# Patient Record
Sex: MALE | Race: WHITE | URBAN - METROPOLITAN AREA
[De-identification: names, ages, dates, MRNs, and addresses within clinical notes are randomized per-mention and may not be internally consistent; named-entity substitution may affect disease eponyms.]

---

## 2022-07-18 ENCOUNTER — OFFICE VISIT (OUTPATIENT)
Dept: ORTHOPEDIC SURGERY | Age: 62
End: 2022-07-18
Payer: COMMERCIAL

## 2022-07-18 VITALS — WEIGHT: 240 LBS | RESPIRATION RATE: 15 BRPM | HEIGHT: 68 IN | BODY MASS INDEX: 36.37 KG/M2

## 2022-07-18 DIAGNOSIS — M25.561 RIGHT KNEE PAIN, UNSPECIFIED CHRONICITY: Primary | ICD-10-CM

## 2022-07-18 PROCEDURE — L1812 KO ELASTIC W/JOINTS PRE OTS: HCPCS | Performed by: PHYSICIAN ASSISTANT

## 2022-07-18 PROCEDURE — 99203 OFFICE O/P NEW LOW 30 MIN: CPT | Performed by: PHYSICIAN ASSISTANT

## 2022-07-18 RX ORDER — ATORVASTATIN CALCIUM 20 MG/1
20 TABLET, FILM COATED ORAL DAILY
COMMUNITY

## 2022-07-18 RX ORDER — HYDROCORTISONE ACETATE 0.5 %
CREAM (GRAM) TOPICAL
COMMUNITY

## 2022-07-18 RX ORDER — METHYLPREDNISOLONE 4 MG/1
4 TABLET ORAL SEE ADMIN INSTRUCTIONS
Qty: 1 KIT | Refills: 0 | Status: SHIPPED | OUTPATIENT
Start: 2022-07-18 | End: 2022-07-24

## 2022-07-18 RX ORDER — VALSARTAN 160 MG/1
160 TABLET ORAL DAILY
COMMUNITY

## 2022-07-18 RX ORDER — AMLODIPINE BESYLATE 10 MG/1
10 TABLET ORAL DAILY
COMMUNITY

## 2022-07-18 NOTE — PROGRESS NOTES
This dictation was done with [employer] dictation and may contain mechanical errors related to translation. I have today reviewed with Tahir Espinoza the clinically relevant, past medical history, medications, allergies, family history, social history, and Review Of Systems form the patients most recent history form & I have documented any details relevant to today's presenting complaints in my history below. Mr. Dash Murillo's self-reported past medical history, medications, allergies, family history, social history, and Review Of Systems form has been scanned into the chart under the \"Media\" tab. Subjective:  Tahir Espinoza is a 58 y.o. who is here complaining of pain in his right knee that has increased recently. He does not remember 1 specific activity but now has pain with walking up and down steps prolonged sitting infectious sleep at night. The pains in the meet the pain is in the medial aspect of his right knee. He was sent for x-rays including standing AP lateral sunrise view and a tunnel view of his right knee      Patient Active Problem List   Diagnosis    Primary osteoarthritis of right knee           Current Outpatient Medications on File Prior to Visit   Medication Sig Dispense Refill    atorvastatin (LIPITOR) 20 MG tablet Take 20 mg by mouth in the morning. valsartan (DIOVAN) 160 MG tablet Take 160 mg by mouth in the morning. amLODIPine (NORVASC) 10 MG tablet Take 10 mg by mouth in the morning. metFORMIN (GLUCOPHAGE) 500 MG tablet Take 500 mg by mouth in the morning and 500 mg in the evening. Take with meals.       Glucosamine-Chondroit-Vit C-Mn (GLUCOSAMINE 1500 COMPLEX) CAPS Take by mouth      loratadine (CLARITIN) 10 MG capsule Take by mouth      Multiple Vitamins-Minerals (THERAPEUTIC MULTIVITAMIN-MINERALS) tablet Take 1 tablet by mouth daily      Fexofenadine HCl (MUCINEX ALLERGY PO) Take by mouth (Patient not taking: Reported on 7/18/2022)       No current facility-administered medications on file prior to visit. Objective:   Resp. rate 15, height 5' 8\" (1.727 m), weight 240 lb (108.9 kg). On examination this pleasant 59-year-old gentleman who is alert and oriented x3 he has a 36 BMI but decent quad tone about 0 to calf on thigh range of motion no varus or valgus laxity but overall he has a varus alignment and medial joint line tenderness along with swelling and soreness. There is crepitus during flexion extension through the patellofemoral joint. Negative for Lachman negative for a pivot shift. Neuro exam grossly intact both lower extremities. Intact sensation to light touch. Motor exam 4+ to 5/5 in all major motor groups. Negative Overton's sign. Skin is warm, dry and intact with out erythema or significant increased temperature around the knee joint(s). There are no cutaneous lesions or lymphadenopathy present. X-RAYS:  X-rays taken the office today of the knee showed no obvious fractures there is almost complete bone-on-bone osteoarthritis in the medial compartment with joint space loss subchondral sclerosis and some osteophyte formation. Patellofemoral joint pretty well-preserved there is no obvious fracture seen elsewhere and this was shown to the patient      Assessment:  Medial joint osteoarthritis possible medial meniscus injury    Plan:  During today's visit, there was approximately 35 minutes of face-to-face discussion in regards to the patient's current condition and treatment options. More than 50 % of the time was counseling and coordination of care as indicated above. At this point we will place him on a strong anti-inflammatory we will fit him with an economy hinged brace and have him do some quad exercises. Follow-up with us in 2 to 3 weeks for repeat examination      PROCEDURE NOTE:        Procedures    Breg Economy Hinged Knee WrapAround Brace     Patient was prescribed a Breg Economy Hinged Wrap Around Knee Brace.   The right knee will require stabilization / immobilization from this semi-rigid / rigid orthosis to improve their function. The orthosis will assist in protecting the affected area, provide functional support and facilitate healing. The patient was educated and fit by a healthcare professional with expert knowledge and specialization in brace application while under the direct supervision of the treating physician. Verbal and written instructions for the use of and application of this item were provided. They were instructed to contact the office immediately should the brace result in increased pain, decreased sensation, increased swelling or worsening of the condition.            They will schedule a follow up in 2 to 3 weeks

## 2022-07-25 ENCOUNTER — OFFICE VISIT (OUTPATIENT)
Dept: ORTHOPEDIC SURGERY | Age: 62
End: 2022-07-25
Payer: COMMERCIAL

## 2022-07-25 VITALS — BODY MASS INDEX: 36.37 KG/M2 | HEIGHT: 68 IN | WEIGHT: 240 LBS

## 2022-07-25 DIAGNOSIS — M65.9 SYNOVITIS OF RIGHT KNEE: ICD-10-CM

## 2022-07-25 DIAGNOSIS — M17.11 PRIMARY OSTEOARTHRITIS OF RIGHT KNEE: Primary | ICD-10-CM

## 2022-07-25 DIAGNOSIS — M22.41 CHONDROMALACIA PATELLAE OF RIGHT KNEE: ICD-10-CM

## 2022-07-25 PROCEDURE — 20610 DRAIN/INJ JOINT/BURSA W/O US: CPT | Performed by: FAMILY MEDICINE

## 2022-07-25 PROCEDURE — 99203 OFFICE O/P NEW LOW 30 MIN: CPT | Performed by: FAMILY MEDICINE

## 2022-07-25 RX ORDER — LIDOCAINE HYDROCHLORIDE 10 MG/ML
1 INJECTION, SOLUTION INFILTRATION; PERINEURAL ONCE
Status: COMPLETED | OUTPATIENT
Start: 2022-07-25 | End: 2022-07-25

## 2022-07-25 RX ORDER — BETAMETHASONE SODIUM PHOSPHATE AND BETAMETHASONE ACETATE 3; 3 MG/ML; MG/ML
12 INJECTION, SUSPENSION INTRA-ARTICULAR; INTRALESIONAL; INTRAMUSCULAR; SOFT TISSUE ONCE
Status: COMPLETED | OUTPATIENT
Start: 2022-07-25 | End: 2022-07-25

## 2022-07-25 RX ORDER — BUPIVACAINE HYDROCHLORIDE 2.5 MG/ML
2 INJECTION, SOLUTION INFILTRATION; PERINEURAL ONCE
Status: COMPLETED | OUTPATIENT
Start: 2022-07-25 | End: 2022-07-25

## 2022-07-25 RX ORDER — MELOXICAM 15 MG/1
15 TABLET ORAL DAILY
Qty: 30 TABLET | Refills: 3 | Status: SHIPPED | OUTPATIENT
Start: 2022-07-25

## 2022-07-25 RX ADMIN — BUPIVACAINE HYDROCHLORIDE 5 MG: 2.5 INJECTION, SOLUTION INFILTRATION; PERINEURAL at 08:01

## 2022-07-25 RX ADMIN — LIDOCAINE HYDROCHLORIDE 1 ML: 10 INJECTION, SOLUTION INFILTRATION; PERINEURAL at 08:02

## 2022-07-25 RX ADMIN — BETAMETHASONE SODIUM PHOSPHATE AND BETAMETHASONE ACETATE 12 MG: 3; 3 INJECTION, SUSPENSION INTRA-ARTICULAR; INTRALESIONAL; INTRAMUSCULAR; SOFT TISSUE at 08:00

## 2022-07-25 NOTE — PROGRESS NOTES
Chief Complaint  Knee Pain ([facility])      Initial consultation progressively worsening right anterior medial knee pain    History of Present Illness:  Aleena Mendes is a 58 y.o. male who is a very pleasant weight male who is a  for [employer] and does play recreational [employer] ball quite frequently and walk and per the patient is a prediabetic on metformin and is hoping to lose weight and states that he has had episodic pain to his right knee for the past several years but more of a dull and achy but states that in early July 2022 became much more problematic with increased pain and swelling and actively limping. He initially treated himself with some ice and Tylenol but was eventually evaluated in after-hours on 7/18/2020 was placed on Medrol pack which is helped 60 to 70%. He was given a wraparound brace which she was using incorrectly. It is more of a soreness and stiffness both anteriorly and medially and was most problematic with sitting for prolonged period getting up such as getting up from his desk and going up and down stairs and walking on uneven surfaces. He did have x-rays showing significant medial compartment arthropathy but has no previous history of surgery. His major goal is to continue to walk and exercise to help lose weight and control his sugars. He is being seen today for orthopedic and sports consultation with imaging. He has not been on anti-inflammatories      Pain Assessment  Location of Pain: Knee  Location Modifiers: Right  Severity of Pain: 2  Quality of Pain: Dull, Aching  Duration of Pain: Persistent  Frequency of Pain: Constant  Limiting Behavior: Yes  Relieving Factors: Rest  Result of Injury: No  Work-Related Injury: No  Are there other pain locations you wish to document?: No         Medical History     Patient's medications, allergies, past medical, surgical, social and family histories were reviewed and updated as appropriate.     Review of Systems  Pertinent items are noted in HPI  Review of systems reviewed from Patient History Form dated on 7/25/2022 and available in the patient's chart under the Media tab. Vital Signs  There were no vitals filed for this visit. General Exam:     Constitutional: Patient is adequately groomed with no evidence of malnutrition  DTRs: Deep tendon reflexes are intact  Mental Status: The patient is oriented to time, place and person. The patient's mood and affect are appropriate. Lymphatic: The lymphatic examination bilaterally reveals all areas to be without enlargement or induration. Vascular: Examination reveals no swelling or calf tenderness. Peripheral pulses are palpable and 2+. Neurological: The patient has good coordination. There is no weakness or sensory deficit. Knee Examination  Inspection: There is no evidence of high-grade deformities although he does have some patellofemoral crepitation    Palpation: Subclinical tenderness over the medial and lateral patellofemoral facet and some residual pain with patellar grind testing and some mild pain with crepitation with medial Byron's remarkable for crepitation. Negative lateral Byron's. Rang of Motion: Stiff in terminal 5 to 10 degrees of extension with flexion to about 115. Hamstrings are tight. Strength: 4 Plus out of 5 with knee flexion extension. Special Tests: He does have some pain residually with patellar grind testing and a Byron's with rotation. Not since high-grade instability. Skin: There are no rashes, ulcerations or lesions. Distal neurovascular exam is intact. Gait: Reasonable gait. Reflex symmetrically preserved    Additional Comments:     Additional Examinations:  Contralateral Exam: Examination of the left knee reveals intact skin. There is no focal tenderness. The patient demonstrates full painless range of motion with regards to flexion and extension. Strength is 5/5 thorough out all planes. Ligamentous stability is grossly intact. Right Lower Extremity: Examination of the right lower extremity does not show any tenderness, deformity or injury. Range of motion is unremarkable. There is no gross instability. There are no rashes, ulcerations or lesions. Strength and tone are normal.  Left Lower Extremity: Examination of the left lower extremity does not show any tenderness, deformity or injury. Range of motion is unremarkable. There is no gross instability. There are no rashes, ulcerations or lesions. Strength and tone are normal.      Diagnostic Test Findings: Right knee AP and PA weightbearing sunrise and lateral films were obtained 7/8/2022 and does show advanced affected bone-on-bone changes to the medial compartment with fairly prominent patellofemoral arthropathy with spurring      Assessment : #1. Chronic recently worsening right knee pain with advanced effective bone-on-bone changes medical [place] Even with patellofemoral neuropathy and synovitis    Impression:  Encounter Diagnoses   Name Primary? Primary osteoarthritis of right knee Yes    Chondromalacia patellae of right knee     Synovitis of right knee        Office Procedures:  Orders Placed This Encounter   Procedures    Ambulatory referral to Physical Therapy     Referral Priority:   Routine     Referral Type:   Eval and Treat     Referral Reason:   Specialty Services Required     Requested Specialty:   Physical Therapist     Number of Visits Requested:   1    [place] ARTHROCENTESIS ASPIR&/INJ MAJOR JT/BURSA W/O US       Treatment Plan:  Treatment options were discussed with Ashley Muhammad. We did review his plain films and exam findings. He does have quite prominent medial compartment osteoarthritis and significant patellofemoral arthropathy with spurring. He was quite symptomatic a few weeks ago but since finishing his Medrol pack is improved about 60 to 70%.   After discussing options, we did inject his right knee today using 2 cc of Celestone, 2 cc of Marcaine, 1 cc Xylocaine. We will start him in a short course of supervised physical therapy and will continue with his wraparound wrist with ambulatory activities. We also started him on meloxicam.  Potential for viscosupplementation was discussed. We will see him back in about 4 weeks for follow-up. He will contact us in the interim with questions or concerns. This dictation was performed with a verbal recognition program (DRAGON) and it was checked for errors. It is possible that there are still dictated errors within this office note. If so, please bring any errors to my attention for an addendum. All efforts were made to ensure that this office note is accurate.

## 2022-07-28 ENCOUNTER — HOSPITAL ENCOUNTER (OUTPATIENT)
Dept: PHYSICAL THERAPY | Age: 62
Setting detail: THERAPIES SERIES
Discharge: HOME | End: 2022-07-28
Payer: COMMERCIAL

## 2022-07-28 PROCEDURE — 97110 THERAPEUTIC EXERCISES: CPT | Performed by: PHYSICAL THERAPIST

## 2022-07-28 PROCEDURE — 97161 PT EVAL LOW COMPLEX 20 MIN: CPT | Performed by: PHYSICAL THERAPIST

## 2022-07-28 PROCEDURE — 97140 MANUAL THERAPY 1/> REGIONS: CPT | Performed by: PHYSICAL THERAPIST

## 2022-07-28 NOTE — FLOWSHEET NOTE
weekly - 3 sets - 10 reps  Small Range Straight Leg Raise - 2 x daily - 7 x weekly - 3 sets - 10 reps  Hooklying Clamshell with Resistance - 2 x daily - 7 x weekly - 3 sets - 10 reps  Seated Table Hamstring Stretch - 2 x daily - 7 x weekly - 1 sets - 3 reps - 30 hold  Long Sitting Calf Stretch with Strap - 2 x daily - 7 x weekly - 3 reps - 30 hold        Therapeutic Ex (99463) Sets/sec Reps Notes/CUES   Long sitting hamstring stretch :10 5    Long sitting gastroc stretch :30 3    SLR 1 10    QS with roll :10 10    SAQ with ball squeeze 1 10    HL clams 1 15 Blue TB                                       Manual Intervention (17088)      Patellar mobs 6'     ITB CFM distally 2'                             NMR re-education (93456)   CUES NEEDED   Pt. Ed:re findings, anatomy, progosis, HEP progression, PPP strategies 8'                                                     Therapeutic Activity (04186)                                                Therapeutic Exercise and NMR EXR  [x] (20290) Provided verbal/tactile cueing for activities related to strengthening, flexibility, endurance, ROM for improvements in LE, proximal hip, and core control with self care, mobility, lifting, ambulation.  [] (99152) Provided verbal/tactile cueing for activities related to improving balance, coordination, kinesthetic sense, posture, motor skill, proprioception  to assist with LE, proximal hip, and core control in self care, mobility, lifting, ambulation and eccentric single leg control.      NMR and Therapeutic Activities:    [] (82216 or 47003) Provided verbal/tactile cueing for activities related to improving balance, coordination, kinesthetic sense, posture, motor skill, proprioception and motor activation to allow for proper function of core, proximal hip and LE with self care and ADLs  [] (05846) Gait Re-education- Provided training and instruction to the patient for proper LE, core and proximal hip recruitment and positioning and eccentric body weight control with ambulation re-education including up and down stairs     Home Exercise Program:    [x] (89904) Reviewed/Progressed HEP activities related to strengthening, flexibility, endurance, ROM of core, proximal hip and LE for functional self-care, mobility, lifting and ambulation/stair navigation   [] (93472)Reviewed/Progressed HEP activities related to improving balance, coordination, kinesthetic sense, posture, motor skill, proprioception of core, proximal hip and LE for self care, mobility, lifting, and ambulation/stair navigation      Manual Treatments:  PROM / STM / Oscillations-Mobs:  G-I, II, III, IV (PA's, Inf., Post.)  [x] (12090) Provided manual therapy to mobilize LE, proximal hip and/or LS spine soft tissue/joints for the purpose of modulating pain, promoting relaxation,  increasing ROM, reducing/eliminating soft tissue swelling/inflammation/restriction, improving soft tissue extensibility and allowing for proper ROM for normal function with self care, mobility, lifting and ambulation. Modalities:     [] GAME READY (VASO)- for significant edema, swelling, pain control. Charges  Timed Code Treatment Minutes: 26   Total Treatment Minutes: 45     Medicare total (add KX at $2000)         BW time in/ time out:    TE time in /out    Manual time in/out    Neuro re ed time in/out    Untimed minutes        [x] EVAL (LOW) 19686   [] EVAL (MOD) 93054  [] EVAL (HIGH) 74423   [] RE-EVAL     [] HP(58708) x     [] IONTO  [] NMR (53431) x     [] VASO  [] Manual (96116) x      [] Other:  [] TA x      [] Mech Traction (86812)  [] ES(attended) (49929)      [] ES (un) (77364):       GOALS:   Patient stated goal: Managing knee problems  [] Progressing: [] Met: [] Not Met: [] Adjusted    Therapist goals for Patient:   Short Term Goals: To be achieved in: 2 weeks  1. Independent in HEP and progression per patient tolerance, in order to prevent re-injury.    [] Progressing: [] Met: [] Not Met: [] Adjusted  2. Patient will have a decrease in pain to facilitate improvement in movement, function, and ADLs as indicated by Functional Deficits. [] Progressing: [] Met: [] Not Met: [] Adjusted    Long Term Goals: To be achieved in: 6 weeks  1. Disability index score of 79% or more per FOTO to assist with reaching prior level of function. [] Progressing: [] Met: [] Not Met: [] Adjusted  2. Patient will demonstrate increased AROM to 120 R knee flexion to allow for proper joint functioning as indicated by patients Functional Deficits. [] Progressing: [] Met: [] Not Met: [] Adjusted  3. Patient will demonstrate an increase in Strength to good proximal hip strength and control, within 5lb HHD in LE to allow for proper functional mobility as indicated by patients Functional Deficits. [] Progressing: [] Met: [] Not Met: [] Adjusted  4. Patient will return to sitting, transfers sit<>stand and walking functional activities without increased symptoms or restriction. [] Progressing: [] Met: [] Not Met: [] Adjusted  5. Pt. To be able to climb stairs reciprocally without pain(patient specific functional goal)    [] Progressing: [] Met: [] Not Met: [] Adjusted     Progression Towards Functional goals:  [] Patient is progressing as expected towards functional goals listed. [] Progression is slowed due to complexities listed. [] Progression has been slowed due to co-morbidities. [x] Plan just implemented, too soon to assess goals progression  [] Other:         Overall Progression Towards Functional goals/ Treatment Progress Update:  [] Patient is progressing as expected towards functional goals listed. [] Progression is slowed due to complexities/Impairments listed. [] Progression has been slowed due to co-morbidities.   [x] Plan just implemented, too soon to assess goals progression <30days   [] Goals require adjustment due to lack of progress  [] Patient is not progressing as expected and requires additional follow up with physician  [] Other    Prognosis for POC: [x] Good [] Fair  [] Poor      Patient requires continued skilled intervention: [x] Yes  [] No    Treatment/Activity Tolerance:  [x] Patient able to complete treatment  [] Patient limited by fatigue  [] Patient limited by pain    [] Patient limited by other medical complications  [] Other:     ASSESSMENT: see eval    Return to Play: (if applicable)   []  Stage 1: Intro to Strength   []  Stage 2: Return to Run and Strength   []  Stage 3: Return to Jump and Strength   []  Stage 4: Dynamic Strength and Agility   []  Stage 5: Sport Specific Training     []  Ready to Return to Play, Meets All Above Stages   []  Not Ready for Return to Sports   Comments:                               PLAN: See eval  [] Continue per plan of care [] Alter current plan (see comments above)  [x] Plan of care initiated [] Hold pending MD visit [] Discharge      Electronically signed by:  Neli Riggs, PT, MSPT, OMT-C [id number]      Note: If patient does not return for scheduled/ recommended follow up visits, this note will serve as a discharge from care along with most recent update on progress.

## 2022-07-28 NOTE — PLAN OF CARE
[facility], [address]  Phone: [phone]  Fax [phone]     Physical Therapy Certification    Dear Beata Garcia MD,    We had the pleasure of evaluating the following patient for physical therapy services at [address] [place] [address]. A summary of our findings can be found in the initial assessment below. This includes our plan of care. If you have any questions or concerns regarding these findings, please do not hesitate to contact me at the office phone number checked above. Thank you for the referral.       Physician Signature:_______________________________Date:__________________  By signing above (or electronic signature), therapists plan is approved by physician    Patient: Katalina Fernanedz   : 1960   MRN: [id number]  Referring Physician:  Beata Garcia MD      Evaluation Date: 2022      Medical Diagnosis Information:  Diagnosis: M17.11 (ICD-10-CM) - Primary osteoarthritis of right knee  M22.41 (ICD-10-CM) - Chondromalacia patellae of right knee  M65.9 (ICD-10-CM) - Synovitis of right knee     Treatment diagnosis:Right knee pain M25.561                                         Insurance information:  BCBS       Precautions/ Contra-indications: OA    C-SSRS Triggered by Intake questionnaire (Past 2 wk assessment):   [x] No, Questionnaire did not trigger screening.   [] Yes, Patient intake triggered further evaluation      [] C-SSRS Screening completed  [] PCP notified via Plan of Care  [] Emergency services notified     Latex Allergy:  [x]NO      []YES  Preferred Language for Healthcare:   [x]English       []other:    SUBJECTIVE: Patient stated complaint:Pt. Reports that he has had a many year history of R knee nagging pain and worsened 2-4 weeks ago and noticed sharp pain with getting up from desk. Had a cortisone injection and is feeling better now.   The nagging pain is still present with getting up from sitting, and with steps. Relevant Medical History:OA  Functional Disability Index: FOTO 67/100    Height 5'8\" [id number] lb  Pain Scale: 2/10-5/10  Easing factors: cortisone injection, NSAID, ice  Provocative factors: squatting, getting up from sitting, stairs     Type: [x]Constant   []Intermittent  []Radiating [x]Localized []other:     Numbness/Tingling: none    Occupation/School:     Living Status/Prior Level of Function: Independent with ADLs and IADLs, pt. Has steps to 2nd floor     OBJECTIVE:     ROM LEFT RIGHT   HIP Flex 80 80   HIP Abd     HIP Ext     HIP IR     HIP ER     Knee ext 0 0   Knee Flex 115 100   Ankle PF     Ankle DF     Ankle In     Ankle Ev     Strength  LEFT RIGHT   HIP Flexors  4+   HIP Abductors  4+   HIP Ext     Hip ER     Knee EXT (quad)  Quad tone F   Knee Flex (HS)  4+   Ankle DF     Ankle PF     Ankle Inv     Ankle EV          Circumference  Mid apex  7 cm prox             Reflexes/Sensation:    [x]Dermatomes/Myotomes intact    []Reflexes equal and normal bilaterally   []Other:    Joint mobility:    []Normal    [x]Hypo R PFJ, knee   []Hyper    Palpation: Medial jt. Line, HS    Functional Mobility/Transfers: independent    Posture:     Bandages/Dressings/Incisions:     Gait: (include devices/WB status) WFL    Orthopedic Special Tests: (+)apprehension, (-)McMurrays                       [x] Patient history, allergies, meds reviewed. Medical chart reviewed. See intake form. Review Of Systems (ROS):  [x]Performed Review of systems (Integumentary, CardioPulmonary, Neurological) by intake and observation. Intake form has been scanned into medical record. Patient has been instructed to contact their primary care physician regarding ROS issues if not already being addressed at this time.       Co-morbidities/Complexities (which will affect course of rehabilitation):   []None           Arthritic conditions   []Rheumatoid arthritis (M05.9)  [x]Osteoarthritis (M19.91)   Cardiovascular conditions   []Hypertension (I10)  []Hyperlipidemia (E78.5)  []Angina pectoris (I20)  []Atherosclerosis (I70)   Musculoskeletal conditions   []Disc pathology   []Congenital spine pathologies   []Prior surgical intervention  []Osteoporosis (M81.8)  []Osteopenia (M85.8)   Endocrine conditions   []Hypothyroid (E03.9)  []Hyperthyroid Gastrointestinal conditions   []Constipation (L31.65)   Metabolic conditions   []Morbid obesity (E66.01)  []Diabetes type 1(E10.65) or 2 (E11.65)   []Neuropathy (G60.9)     Pulmonary conditions   []Asthma (J45)  []Coughing   []COPD (J44.9)   Psychological Disorders  []Anxiety (F41.9)  []Depression (F32.9)   []Other:   []Other:          Barriers to/and or personal factors that will affect rehab potential:              []Age  []Sex              []Motivation/Lack of Motivation                        [x]Co-Morbidities              []Cognitive Function, education/learning barriers              []Environmental, home barriers              []profession/work barriers  []past PT/medical experience  []other:  Justification: pt. With several year h/o knee pain and medial compartment OA, pain is less with cortisone, so expect that pt. Will do well with ex. And progression of functional activity    Falls Risk Assessment (30 days):   [x] Falls Risk assessed and no intervention required.   [] Falls Risk assessed and Patient requires intervention due to being higher risk   TUG score (>12s at risk):     [] Falls education provided, including           ASSESSMENT:   Functional Impairments:     [x]Noted lumbar/proximal hip/LE joint hypomobility   [x]Decreased LE functional ROM   [x]Decreased core/proximal hip strength and neuromuscular control   [x]Decreased LE functional strength   [x]Reduced balance/proprioceptive control   []other:      Functional Activity Limitations (from functional questionnaire and intake)   [x]Reduced ability to tolerate prolonged functional positions   [x]Reduced ability or difficulty with changes of positions or transfers between positions   [x]Reduced ability to maintain good posture and demonstrate good body mechanics with sitting, bending, and lifting   [x]Reduced ability to sleep   [] Reduced ability or tolerance with driving and/or computer work   []Reduced ability to perform lifting, carrying tasks   [x]Reduced ability to squat   []Reduced ability to forward bend   [x]Reduced ability to ambulate prolonged functional periods/distances/surfaces   [x]Reduced ability to ascend/descend stairs   []Reduced ability to run, hop, cut or jump   []other:    Participation Restrictions   []Reduced participation in self care activities   []Reduced participation in home management activities   []Reduced participation in work activities   []Reduced participation in social activities. []Reduced participation in sport/recreation activities. Classification :    []Signs/symptoms consistent with post-surgical status including decreased ROM, strength and function.    []Signs/symptoms consistent with joint sprain/strain   []Signs/symptoms consistent with patella-femoral syndrome   [x]Signs/symptoms consistent with knee OA/hip OA   []Signs/symptoms consistent with internal derangement of knee/Hip   []Signs/symptoms consistent with functional hip weakness/NMR control      []Signs/symptoms consistent with tendinitis/tendinosis    []signs/symptoms consistent with pathology which may benefit from Dry needling      []other:      Prognosis/Rehab Potential:      []Excellent   [x]Good    []Fair   []Poor    Tolerance of evaluation/treatment:    []Excellent   [x]Good    []Fair   []Poor  Physical Therapy Evaluation Complexity Justification  [x] A history of present problem with:  [] no personal factors and/or comorbidities that impact the plan of care;  [x]1-2 personal factors and/or comorbidities that impact the plan of care  []3 personal factors and/or comorbidities that impact the plan of care  [x] An examination of body systems using standardized tests and measures addressing any of the following: body structures and functions (impairments), activity limitations, and/or participation restrictions;:  [] a total of 1-2 or more elements   [x] a total of 3 or more elements   [] a total of 4 or more elements   [x] A clinical presentation with:  [x] stable and/or uncomplicated characteristics   [] evolving clinical presentation with changing characteristics  [] unstable and unpredictable characteristics;   [x] Clinical decision making of [x] low, [] moderate, [] high complexity using standardized patient assessment instrument and/or measurable assessment of functional outcome. [x] EVAL (LOW) 47454 (typically 20 minutes face-to-face)  [] EVAL (MOD) 70011 (typically 30 minutes face-to-face)  [] EVAL (HIGH) 20681 (typically 45 minutes face-to-face)  [] RE-EVAL       PLAN:   Frequency/Duration:  1-2 days per week for 6 Weeks:  Interventions:  [x]  Therapeutic exercise including: strength training, ROM, for Lower extremity and core   [x]  NMR activation and proprioception for LE, Glutes and Core   [x]  Manual therapy as indicated for LE, Hip and spine to include: Dry Needling/IASTM, STM, PROM, Gr I-IV mobilizations, manipulation. [x] Modalities as needed that may include: thermal agents, E-stim, Biofeedback, US, iontophoresis as indicated  [x] Patient education on joint protection, postural re-education, activity modification, progression of HEP. HEP instruction: refer to [address] [place] [address] access code and exercises on the 1st visit treatment note   GOALS:  Patient stated goal: Managing knee problems  [] Progressing: [] Met: [] Not Met: [] Adjusted    Therapist goals for Patient:   Short Term Goals: To be achieved in: 2 weeks  1. Independent in HEP and progression per patient tolerance, in order to prevent re-injury. [] Progressing: [] Met: [] Not Met: [] Adjusted  2. Patient will have a decrease in pain to facilitate improvement in movement, function, and ADLs as indicated by Functional Deficits. [] Progressing: [] Met: [] Not Met: [] Adjusted    Long Term Goals: To be achieved in: 6 weeks  1. Disability index score of 79% or more per FOTO to assist with reaching prior level of function. [] Progressing: [] Met: [] Not Met: [] Adjusted  2. Patient will demonstrate increased AROM to 120 R knee flexion to allow for proper joint functioning as indicated by patients Functional Deficits. [] Progressing: [] Met: [] Not Met: [] Adjusted  3. Patient will demonstrate an increase in Strength to good proximal hip strength and control, within 5lb HHD in LE to allow for proper functional mobility as indicated by patients Functional Deficits. [] Progressing: [] Met: [] Not Met: [] Adjusted  4. Patient will return to sitting, transfers sit<>stand and walking functional activities without increased symptoms or restriction. [] Progressing: [] Met: [] Not Met: [] Adjusted  5. Pt.  To be able to climb stairs reciprocally without pain(patient specific functional goal)    [] Progressing: [] Met: [] Not Met: [] Adjusted     Electronically signed by:  Gale Wright PT, [facility], [facility]- [id number]

## 2022-08-10 ENCOUNTER — HOSPITAL ENCOUNTER (OUTPATIENT)
Dept: PHYSICAL THERAPY | Age: 62
Setting detail: THERAPIES SERIES
Discharge: HOME | End: 2022-08-10
Payer: COMMERCIAL

## 2022-08-10 PROCEDURE — 97112 NEUROMUSCULAR REEDUCATION: CPT | Performed by: PHYSICAL THERAPIST

## 2022-08-10 NOTE — FLOWSHEET NOTE
[facility], [address]  Phone: [phone]  Fax [phone]    Physical Therapy Treatment Note/ Progress Report:           Date:  8/10/2022    Patient Name:  Ken Johnson    :  1960  MRN: [id number]  Restrictions/Precautions:    Medical/Treatment Diagnosis Information:  Diagnosis: M17.11 (ICD-10-CM) - Primary osteoarthritis of right knee  M22.41 (ICD-10-CM) - Chondromalacia patellae of right knee  M65.9 (ICD-10-CM) - Synovitis of right knee   Treatment diagnosis:Right knee pain M25.561                                         Insurance/Certification information:     Physician Information:   Landrum Epley, MD        Has the plan of care been signed (Y/N):        []  Yes  [x]  No     Date of Patient follow up with Physician: 22      Is this a Progress Report:     []  Yes  [x]  No        If Yes:  Date Range for reporting period:  Beginning 22  Ending    Progress report will be due (10 Rx or 30 days whichever is less): 3/29/79       Recertification will be due (POC Duration  / 90 days whichever is less): 6 weeks      Visit # Insurance Allowable Auth Required   In-person 2 BCBS/ 60 visits/high deductible []  Yes [x]  No    Telehealth   []  Yes []  No    Total          Functional Scale: FOTO 67/100    Date assessed:  22      Therapy Diagnosis/Practice Pattern:E      Number of Comorbidities:  []0     [x]1-2    []3+    Latex Allergy:  [x]NO      []YES  Preferred Language for Healthcare:   [x]English       []other:      Pain level:  2-5/10     SUBJECTIVE: Reports going on vacation last week and was not good about doing exercises. Reports being more sore in general behind the knee and on top of the knee -cap. OBJECTIVE: See eval. Modified and progressed HEP this visit .  See below  Observation:   Test measurements:  knee flex: 120 this visit    RESTRICTIONS/PRECAUTIONS:     Exercises/Interventions: Access Code: [email or web address]  URL: [employer]  Date: 08/10/2022  Prepared by: Bao Bunch    Exercises  Long Sitting Quad Set - 2 x daily - 7 x weekly - 1 sets - 10 reps - 10 hold  Short Arc Quad with Ball Squeeze - 2 x daily - 7 x weekly - 3 sets - 10 reps  Small Range Straight Leg Raise - 2 x daily - 7 x weekly - 3 sets - 10 reps  Seated Table Hamstring Stretch - 2 x daily - 7 x weekly - 1 sets - 3 reps - 30 hold  Long Sitting Calf Stretch with Strap - 2 x daily - 7 x weekly - 3 reps - 30 hold  Clamshell with Resistance - 2 x daily - 7 x weekly - 3 sets - 10 reps - 5 hold  Supine Quadriceps Stretch with Strap on Table - 2 x daily - 7 x weekly - 3 sets - 10 reps - 20-30 hold      Therapeutic Ex (24032) Sets/sec Reps Notes/CUES   Long sitting hamstring stretch :10 5    Long sitting gastroc stretch :30 3    SLR 1 10    QS with roll    SAQ with ball squeeze 1 10    HL clams       SL clams BTB H5 2x10 reps + to HEP               Bike  5'  At end of treatment session         Manual Intervention (86676)      Patellar mobs/tibial mobs 10'     ITB CFM distally  Not as tender this visit. NMR re-education (51384)   CUES NEEDED   Pt. Ed:re findings, anatomy, progosis, HEP progression, PPP strategies 8'  Discussed benefits of exercise in treating OA.                                                    Therapeutic Activity (00426)                                                Therapeutic Exercise and NMR EXR  [x] (47405) Provided verbal/tactile cueing for activities related to strengthening, flexibility, endurance, ROM for improvements in LE, proximal hip, and core control with self care, mobility, lifting, ambulation.  [] (87803) Provided verbal/tactile cueing for activities related to improving balance, coordination, kinesthetic sense, posture, motor skill, proprioception  to assist with LE, proximal hip, and core control in self care, mobility, lifting, ambulation and eccentric single IONTO  [x] NMR (90981) x   1  [] VASO  [] Manual (69697) x      [] Other:  [] TA x      [] Mech Traction (78355)  [] ES(attended) (96594)      [] ES (un) (12894):       GOALS:   Patient stated goal: Managing knee problems  [] Progressing: [] Met: [] Not Met: [] Adjusted    Therapist goals for Patient:   Short Term Goals: To be achieved in: 2 weeks  1. Independent in HEP and progression per patient tolerance, in order to prevent re-injury. [] Progressing: [] Met: [] Not Met: [] Adjusted  2. Patient will have a decrease in pain to facilitate improvement in movement, function, and ADLs as indicated by Functional Deficits. [] Progressing: [] Met: [] Not Met: [] Adjusted    Long Term Goals: To be achieved in: 6 weeks  1. Disability index score of 79% or more per FOTO to assist with reaching prior level of function. [] Progressing: [] Met: [] Not Met: [] Adjusted  2. Patient will demonstrate increased AROM to 120 R knee flexion to allow for proper joint functioning as indicated by patients Functional Deficits. [] Progressing: [] Met: [] Not Met: [] Adjusted  3. Patient will demonstrate an increase in Strength to good proximal hip strength and control, within 5lb HHD in LE to allow for proper functional mobility as indicated by patients Functional Deficits. [] Progressing: [] Met: [] Not Met: [] Adjusted  4. Patient will return to sitting, transfers sit<>stand and walking functional activities without increased symptoms or restriction. [] Progressing: [] Met: [] Not Met: [] Adjusted  5. Pt. To be able to climb stairs reciprocally without pain(patient specific functional goal)    [] Progressing: [] Met: [] Not Met: [] Adjusted     Progression Towards Functional goals:  [] Patient is progressing as expected towards functional goals listed. [] Progression is slowed due to complexities listed. [] Progression has been slowed due to co-morbidities.   [x] Plan just implemented, too soon to assess goals progression  [] Other:         Overall Progression Towards Functional goals/ Treatment Progress Update:  [] Patient is progressing as expected towards functional goals listed. [] Progression is slowed due to complexities/Impairments listed. [] Progression has been slowed due to co-morbidities. [x] Plan just implemented, too soon to assess goals progression <30days   [] Goals require adjustment due to lack of progress  [] Patient is not progressing as expected and requires additional follow up with physician  [] Other    Prognosis for POC: [x] Good [] Fair  [] Poor      Patient requires continued skilled intervention: [x] Yes  [] No    Treatment/Activity Tolerance:  [x] Patient able to complete treatment  [] Patient limited by fatigue  [] Patient limited by pain    [] Patient limited by other medical complications  [] Other:     ASSESSMENT: Modified and progressed HEP. Tight quads and hip flexors noted. + stretches to HEP. Tolerated treatment well. Less pain noted after treatment than upon arrival. Pt requires skilled intervention to restore ROM, strength, functional endurance and balance in order to perform ADLs without significant symptoms or limitations. Return to Play: (if applicable)   []  Stage 1: Intro to Strength   []  Stage 2: Return to Run and Strength   []  Stage 3: Return to Jump and Strength   []  Stage 4: Dynamic Strength and Agility   []  Stage 5: Sport Specific Training     []  Ready to Return to Play, Meets All Above Stages   []  Not Ready for Return to Sports   Comments:                               PLAN: See eval. Progress CKC exercises as tolerated.   [x] Continue per plan of care [] Alter current plan (see comments above)  [] Plan of care initiated [] Hold pending MD visit [] Discharge      Electronically signed by:  Trung Hernandez PT,  [facility] [id number]      Note: If patient does not return for scheduled/ recommended follow up visits, this note will serve as a discharge from care along with most recent update

## 2022-08-12 ENCOUNTER — HOSPITAL ENCOUNTER (OUTPATIENT)
Dept: PHYSICAL THERAPY | Age: 62
Setting detail: THERAPIES SERIES
Discharge: HOME | End: 2022-08-12
Payer: COMMERCIAL

## 2022-08-12 PROCEDURE — 97112 NEUROMUSCULAR REEDUCATION: CPT | Performed by: PHYSICAL THERAPIST

## 2022-08-12 NOTE — FLOWSHEET NOTE
[facility], [address]  Phone: [phone]  Fax [phone]    Physical Therapy Treatment Note/ Progress Report:           Date:  2022    Patient Name:  Papa Frias    :  1960  MRN: [id number]  Restrictions/Precautions:    Medical/Treatment Diagnosis Information:  Diagnosis: M17.11 (ICD-10-CM) - Primary osteoarthritis of right knee  M22.41 (ICD-10-CM) - Chondromalacia patellae of right knee  M65.9 (ICD-10-CM) - Synovitis of right knee   Treatment diagnosis:Right knee pain M25.561                                         Insurance/Certification information:     Physician Information:   Meme Moreno MD        Has the plan of care been signed (Y/N):        []  Yes  [x]  No     Date of Patient follow up with Physician: 22      Is this a Progress Report:     []  Yes  [x]  No        If Yes:  Date Range for reporting period:  Beginning 22  Ending    Progress report will be due (10 Rx or 30 days whichever is less): 81       Recertification will be due (POC Duration  / 90 days whichever is less): 6 weeks      Visit # Insurance Allowable Auth Required   In-person 3 BCBS/ 60 visits/high deductible []  Yes [x]  No    Telehealth   []  Yes []  No    Total          Functional Scale: FOTO 67/100    Date assessed:  22      Therapy Diagnosis/Practice Pattern:E      Number of Comorbidities:  []0     [x]1-2    []3+    Latex Allergy:  [x]NO      []YES  Preferred Language for Healthcare:   [x]English       []other:      Pain level:  2-5/10     SUBJECTIVE: Reports going on vacation last week and was not good about doing exercises. Reports being more sore in general behind the knee and on top of the knee -cap. OBJECTIVE: See eval. Modified and progressed HEP this visit .  See below  Observation:   Test measurements:  knee flex: 120 this visit    RESTRICTIONS/PRECAUTIONS:     Exercises/Interventions: Access Code: [email or web address]  URL: [email or web address]  Date: 08/10/2022  Prepared by: Ivania Civil    Exercises  Long Sitting Quad Set - 2 x daily - 7 x weekly - 1 sets - 10 reps - 10 hold  Short Arc Quad with Ball Squeeze - 2 x daily - 7 x weekly - 3 sets - 10 reps  Small Range Straight Leg Raise - 2 x daily - 7 x weekly - 3 sets - 10 reps  Seated Table Hamstring Stretch - 2 x daily - 7 x weekly - 1 sets - 3 reps - 30 hold  Long Sitting Calf Stretch with Strap - 2 x daily - 7 x weekly - 3 reps - 30 hold  Clamshell with Resistance - 2 x daily - 7 x weekly - 3 sets - 10 reps - 5 hold  Supine Quadriceps Stretch with Strap on Table - 2 x daily - 7 x weekly - 3 sets - 10 reps - 20-30 hold      Therapeutic Ex (80051) Sets/sec Reps Notes/CUES   Long sitting hamstring stretch :10 5    Long sitting gastroc stretch :30 3    SLR 2 10    QS with roll HEP     SAQ with ball squeeze 1 10           SL clams BTB H5 2x10 reps + to HEP   SL abd 1 10          Bike  NV  At end of treatment session         Manual Intervention (25619)      Patellar mobs/tibial mobs 10'     ITB CFM distally  Not as tender this visit. NMR re-education (34852)   CUES NEEDED   Pt. Ed:re findings, anatomy, progosis, HEP progression, PPP strategies 3'review  Discussed benefits of exercise in treating OA.                                                    Therapeutic Activity (45675)                                                Therapeutic Exercise and NMR EXR  [x] (28653) Provided verbal/tactile cueing for activities related to strengthening, flexibility, endurance, ROM for improvements in LE, proximal hip, and core control with self care, mobility, lifting, ambulation.  [] (11667) Provided verbal/tactile cueing for activities related to improving balance, coordination, kinesthetic sense, posture, motor skill, proprioception  to assist with LE, proximal hip, and core control in self care, mobility, lifting, ambulation and x     [] IONTO  [x] NMR (09292) x   1  [] VASO  [] Manual (03115) x      [] Other:  [] TA x      [] Mech Traction (04440)  [] ES(attended) (95402)      [] ES (un) (90173):       GOALS:   Patient stated goal: Managing knee problems  [] Progressing: [] Met: [] Not Met: [] Adjusted    Therapist goals for Patient:   Short Term Goals: To be achieved in: 2 weeks  1. Independent in HEP and progression per patient tolerance, in order to prevent re-injury. [] Progressing: [] Met: [] Not Met: [] Adjusted  2. Patient will have a decrease in pain to facilitate improvement in movement, function, and ADLs as indicated by Functional Deficits. [] Progressing: [] Met: [] Not Met: [] Adjusted    Long Term Goals: To be achieved in: 6 weeks  1. Disability index score of 79% or more per FOTO to assist with reaching prior level of function. [] Progressing: [] Met: [] Not Met: [] Adjusted  2. Patient will demonstrate increased AROM to 120 R knee flexion to allow for proper joint functioning as indicated by patients Functional Deficits. [] Progressing: [] Met: [] Not Met: [] Adjusted  3. Patient will demonstrate an increase in Strength to good proximal hip strength and control, within 5lb HHD in LE to allow for proper functional mobility as indicated by patients Functional Deficits. [] Progressing: [] Met: [] Not Met: [] Adjusted  4. Patient will return to sitting, transfers sit<>stand and walking functional activities without increased symptoms or restriction. [] Progressing: [] Met: [] Not Met: [] Adjusted  5. Pt. To be able to climb stairs reciprocally without pain(patient specific functional goal)    [] Progressing: [] Met: [] Not Met: [] Adjusted     Progression Towards Functional goals:  [] Patient is progressing as expected towards functional goals listed. [] Progression is slowed due to complexities listed. [] Progression has been slowed due to co-morbidities.   [x] Plan just implemented, too soon to assess goals progression  [] Other:         Overall Progression Towards Functional goals/ Treatment Progress Update:  [] Patient is progressing as expected towards functional goals listed. [] Progression is slowed due to complexities/Impairments listed. [] Progression has been slowed due to co-morbidities. [x] Plan just implemented, too soon to assess goals progression <30days   [] Goals require adjustment due to lack of progress  [] Patient is not progressing as expected and requires additional follow up with physician  [] Other    Prognosis for POC: [x] Good [] Fair  [] Poor      Patient requires continued skilled intervention: [x] Yes  [] No    Treatment/Activity Tolerance:  [x] Patient able to complete treatment  [] Patient limited by fatigue  [] Patient limited by pain    [] Patient limited by other medical complications  [] Other:     ASSESSMENT: able to perform ex. Today with min to mod cues for correct form. Added SL abd, but not to HEP. Encouraged pt. To be consistent with HEP and to wear the brace when he is on feet more ie. Grocery store, etc.    Return to Play: (if applicable)   []  Stage 1: Intro to Strength   []  Stage 2: Return to Run and Strength   []  Stage 3: Return to Jump and Strength   []  Stage 4: Dynamic Strength and Agility   []  Stage 5: Sport Specific Training     []  Ready to Return to Play, Meets All Above Stages   []  Not Ready for Return to Sports   Comments:                               PLAN: See eval. Progress CKC exercises as tolerated. [x] Continue per plan of care [] Alter current plan (see comments above)  [] Plan of care initiated [] Hold pending MD visit [] Discharge      Electronically signed by:  Mercedes Villegas, PT,  MSPT, OMT-C [id number]        Note: If patient does not return for scheduled/ recommended follow up visits, this note will serve as a discharge from care along with most recent update on progress.

## 2022-08-16 ENCOUNTER — HOSPITAL ENCOUNTER (OUTPATIENT)
Dept: PHYSICAL THERAPY | Age: 62
Setting detail: THERAPIES SERIES
Discharge: HOME | End: 2022-08-16
Payer: COMMERCIAL

## 2022-08-16 PROCEDURE — 97112 NEUROMUSCULAR REEDUCATION: CPT | Performed by: PHYSICAL THERAPIST

## 2022-08-16 NOTE — FLOWSHEET NOTE
[facility],  [address]  Phone: [phone]  Fax [phone]    Physical Therapy Treatment Note/ Progress Report:           Date:  2022    Patient Name:  Kareem Mejia    :  1960  MRN: [id number]  Restrictions/Precautions:    Medical/Treatment Diagnosis Information:  Diagnosis: M17.11 (ICD-10-CM) - Primary osteoarthritis of right knee  M22.41 (ICD-10-CM) - Chondromalacia patellae of right knee  M65.9 (ICD-10-CM) - Synovitis of right knee   Treatment diagnosis:Right knee pain M25.561                                         Insurance/Certification information:     Physician Information:   Tara Tripp MD        Has the plan of care been signed (Y/N):        []  Yes  [x]  No     Date of Patient follow up with Physician: 22      Is this a Progress Report:     []  Yes  [x]  No        If Yes:  Date Range for reporting period:  Beginning 22  Ending    Progress report will be due (10 Rx or 30 days whichever is less): 3/27/08       Recertification will be due (POC Duration  / 90 days whichever is less): 6 weeks      Visit # Insurance Allowable Auth Required   In-person 4 BCBS/ 60 visits/high deductible []  Yes [x]  No    Telehealth   []  Yes []  No    Total          Functional Scale: FOTO 67/100    Date assessed:  22      Therapy Diagnosis/Practice Pattern:E      Number of Comorbidities:  []0     [x]1-2    []3+    Latex Allergy:  [x]NO      []YES  Preferred Language for Healthcare:   [x]English       []other:      Pain level:  2-5/10     SUBJECTIVE: MD note NV. pt. Reports the knee is feeling better in some ways, it seems less swollen maybe from wearing the brace most of Saturday. OBJECTIVE: See angelesal. progressed HEP this visit .  See below  Observation:   Test measurements:  knee flex: 120     RESTRICTIONS/PRECAUTIONS:     Exercises/Interventions:   Access Code: [email or web address]  URL: single leg control. NMR and Therapeutic Activities:    [x] (71870 or 49909) Provided verbal/tactile cueing for activities related to improving balance, coordination, kinesthetic sense, posture, motor skill, proprioception and motor activation to allow for proper function of core, proximal hip and LE with self care and ADLs  [] (22985) Gait Re-education- Provided training and instruction to the patient for proper LE, core and proximal hip recruitment and positioning and eccentric body weight control with ambulation re-education including up and down stairs     Home Exercise Program:    [x] (25107) Reviewed/Progressed HEP activities related to strengthening, flexibility, endurance, ROM of core, proximal hip and LE for functional self-care, mobility, lifting and ambulation/stair navigation   [] (48169)Reviewed/Progressed HEP activities related to improving balance, coordination, kinesthetic sense, posture, motor skill, proprioception of core, proximal hip and LE for self care, mobility, lifting, and ambulation/stair navigation      Manual Treatments:  PROM / STM / Oscillations-Mobs:  G-I, II, III, IV (PA's, Inf., Post.)  [x] (83556) Provided manual therapy to mobilize LE, proximal hip and/or LS spine soft tissue/joints for the purpose of modulating pain, promoting relaxation,  increasing ROM, reducing/eliminating soft tissue swelling/inflammation/restriction, improving soft tissue extensibility and allowing for proper ROM for normal function with self care, mobility, lifting and ambulation. Modalities:     [] GAME READY (VASO)- for significant edema, swelling, pain control.      Charges  Timed Code Treatment Minutes: 22'   Total Treatment Minutes: 22'     Medicare total (add KX at $2000)         BWC time in/ time out:    TE time in /out    Manual time in/out    Neuro re ed time in/out    Untimed minutes        [] EVAL (LOW) 95509   [] EVAL (MOD) 90657  [] EVAL (HIGH) 61381   [] RE-EVAL     [] PE(34221) x     [] IONTO  [x] NMR (17197) x   1  [] VASO  [] Manual (69632) x      [] Other:  [] TA x      [] Mech Traction (68386)  [] ES(attended) (86478)      [] ES (un) (84748):       GOALS:   Patient stated goal: Managing knee problems  [] Progressing: [] Met: [] Not Met: [] Adjusted    Therapist goals for Patient:   Short Term Goals: To be achieved in: 2 weeks  1. Independent in HEP and progression per patient tolerance, in order to prevent re-injury. [] Progressing: [] Met: [] Not Met: [] Adjusted  2. Patient will have a decrease in pain to facilitate improvement in movement, function, and ADLs as indicated by Functional Deficits. [] Progressing: [] Met: [] Not Met: [] Adjusted    Long Term Goals: To be achieved in: 6 weeks  1. Disability index score of 79% or more per FOTO to assist with reaching prior level of function. [] Progressing: [] Met: [] Not Met: [] Adjusted  2. Patient will demonstrate increased AROM to 120 R knee flexion to allow for proper joint functioning as indicated by patients Functional Deficits. [] Progressing: [] Met: [] Not Met: [] Adjusted  3. Patient will demonstrate an increase in Strength to good proximal hip strength and control, within 5lb HHD in LE to allow for proper functional mobility as indicated by patients Functional Deficits. [] Progressing: [] Met: [] Not Met: [] Adjusted  4. Patient will return to sitting, transfers sit<>stand and walking functional activities without increased symptoms or restriction. [] Progressing: [] Met: [] Not Met: [] Adjusted  5. Pt. To be able to climb stairs reciprocally without pain(patient specific functional goal)    [] Progressing: [] Met: [] Not Met: [] Adjusted     Progression Towards Functional goals:  [] Patient is progressing as expected towards functional goals listed. [] Progression is slowed due to complexities listed. [] Progression has been slowed due to co-morbidities.   [x] Plan just implemented, too soon to assess goals progression  [] Other:         Overall Progression Towards Functional goals/ Treatment Progress Update:  [] Patient is progressing as expected towards functional goals listed. [] Progression is slowed due to complexities/Impairments listed. [] Progression has been slowed due to co-morbidities. [x] Plan just implemented, too soon to assess goals progression <30days   [] Goals require adjustment due to lack of progress  [] Patient is not progressing as expected and requires additional follow up with physician  [] Other    Prognosis for POC: [x] Good [] Fair  [] Poor      Patient requires continued skilled intervention: [x] Yes  [] No    Treatment/Activity Tolerance:  [x] Patient able to complete treatment  [] Patient limited by fatigue  [] Patient limited by pain    [] Patient limited by other medical complications  [] Other:     ASSESSMENT: pt. With less swelling today and improved joint mobility R knee. Able to progress to CC ex. Without issue and add resistance/reps with table ex. No pain noted. Improved patellar mobility noted as well. Pt requires skilled intervention to restore ROM, strength, functional endurance and balance in order to perform ADLs without significant symptoms or limitations. Return to Play: (if applicable)   []  Stage 1: Intro to Strength   []  Stage 2: Return to Run and Strength   []  Stage 3: Return to Jump and Strength   []  Stage 4: Dynamic Strength and Agility   []  Stage 5: Sport Specific Training     []  Ready to Return to Play, Meets All Above Stages   []  Not Ready for Return to Sports   Comments:                               PLAN: See eval. Progress CKC exercises as tolerated.   [x] Continue per plan of care [] Alter current plan (see comments above)  [] Plan of care initiated [] Hold pending MD visit [] Discharge      Electronically signed by:  Andria Iverosn, PT,  MSPT, OMT-C [id number]        Note: If patient does not return for scheduled/ recommended follow up visits, this note will serve as a discharge from care along with most recent update on progress.

## 2022-08-19 ENCOUNTER — HOSPITAL ENCOUNTER (OUTPATIENT)
Dept: PHYSICAL THERAPY | Age: 62
Setting detail: THERAPIES SERIES
Discharge: HOME | End: 2022-08-19
Payer: COMMERCIAL

## 2022-08-19 PROCEDURE — 97112 NEUROMUSCULAR REEDUCATION: CPT | Performed by: PHYSICAL THERAPIST

## 2022-08-19 NOTE — FLOWSHEET NOTE
[facility],  [address]  Phone: [phone]  Fax [phone]    Physical Therapy Treatment Note/ Progress Report:           Date:  2022    Patient Name:  Marleny Mario    :  1960  MRN: [id number]  Restrictions/Precautions:    Medical/Treatment Diagnosis Information:  Diagnosis: M17.11 (ICD-10-CM) - Primary osteoarthritis of right knee  M22.41 (ICD-10-CM) - Chondromalacia patellae of right knee  M65.9 (ICD-10-CM) - Synovitis of right knee   Treatment diagnosis:Right knee pain M25.561                                         Insurance/Certification information:     Physician Information:   Suyapa Givens MD        Has the plan of care been signed (Y/N):        []  Yes  [x]  No     Date of Patient follow up with Physician: 22      Is this a Progress Report:     []  Yes  [x]  No        If Yes:  Date Range for reporting period:  Beginning 22  Ending    Progress report will be due (10 Rx or 30 days whichever is less): 3/44/50       Recertification will be due (POC Duration  / 90 days whichever is less): 6 weeks      Visit # Insurance Allowable Auth Required   In-person 5 BCBS/ 60 visits/high deductible []  Yes [x]  No    Telehealth   []  Yes []  No    Total          Functional Scale: FOTO 67/100    Date assessed:  22    FOTO 64/100       22      Therapy Diagnosis/Practice Pattern:E      Number of Comorbidities:  []0     [x]1-2    []3+    Latex Allergy:  [x]NO      []YES  Preferred Language for Healthcare:   [x]English       []other:      Pain level:  2-5/10     SUBJECTIVE: pt. Reports that he tweaked his knee this morning in the shower, but not feeling too bad.     OBJECTIVE:     Observation:  Test measurements:  knee flex: 125 A, 130 P    ROM LEFT RIGHT current   HIP Flex 80 80    HIP Abd        HIP Ext        HIP IR        HIP ER        Knee ext 0 0 0   Knee Flex 115 100 125 A/ 130 P Ankle PF        Ankle DF        Ankle In        Ankle Ev        Strength LEFT RIGHT    HIP Flexors   4+ 4+   HIP Abductors   4+    HIP Ext        Hip ER        Knee EXT (quad)   Quad tone F Quad tone G, knee ext 5-   Knee Flex (HS)   4+ 5        RESTRICTIONS/PRECAUTIONS:     Exercises/Interventions:   Access Code: [email or web address]  URL: [email or web address]  Date: 08/10/2022  Prepared by: Ivania Perish    Exercises  Long Sitting Quad Set - 2 x daily - 7 x weekly - 1 sets - 10 reps - 10 hold  Short Arc Quad with Ball Squeeze - 2 x daily - 7 x weekly - 3 sets - 10 reps  Small Range Straight Leg Raise - 2 x daily - 7 x weekly - 3 sets - 10 reps  Seated Table Hamstring Stretch - 2 x daily - 7 x weekly - 1 sets - 3 reps - 30 hold  Long Sitting Calf Stretch with Strap - 2 x daily - 7 x weekly - 3 reps - 30 hold  Clamshell with Resistance - 2 x daily - 7 x weekly - 3 sets - 10 reps - 5 hold  Supine Quadriceps Stretch with Strap on Table - 2 x daily - 7 x weekly - 3 sets - 10 reps - 20-30 hold      Therapeutic Ex (17294) Sets/sec Reps Notes/CUES   supine hamstring stretch :25 4    Incline gastroc stretch :30 3    Supine KTOS :20 5    SLR 3 10 B 1#   QS with roll HEP     SAQ with ball squeeze 3# 3 10    LAQ 1 10       SL clams 3# HEP  + to HEP   SL abd 1 10 Focus on form         Bike  6'  At end of treatment session         Manual Intervention ([phone])      Patellar mobs/tibial mobs      ITB stretch supine 3                            NMR re-education (44687)   CUES NEEDED   Pt. Ed:re findings, anatomy, progosis, HEP progression, PPP strategies   Discussed benefits of exercise in treating OA.          Tandem balance R back :15 3    FSU 4\"  1 15          Side stepping at wall 1  1 10  10 No resistance  YTB                                                               Therapeutic Exercise and NMR EXR  [x] (00225) Provided verbal/tactile cueing for activities related to strengthening, flexibility, endurance, ROM for improvements control. Charges  Timed Code Treatment Minutes: 22'   Total Treatment Minutes: 22'     Medicare total (add KX at $2000)         BWC time in/ time out:    TE time in /out    Manual time in/out    Neuro re ed time in/out    Untimed minutes        [] EVAL (LOW) 17196   [] EVAL (MOD) 39369  [] EVAL (HIGH) 70213   [] RE-EVAL     [] WF(81815) x     [] IONTO  [x] NMR (51347) x   1  [] VASO  [] Manual (17238) x      [] Other:  [] TA x      [] Mech Traction (86898)  [] ES(attended) (67745)      [] ES (un) (08809):       GOALS:   Patient stated goal: Managing knee problems  [] Progressing: [] Met: [] Not Met: [] Adjusted    Therapist goals for Patient:   Short Term Goals: To be achieved in: 2 weeks  1. Independent in HEP and progression per patient tolerance, in order to prevent re-injury. [] Progressing: [] Met: [] Not Met: [] Adjusted  2. Patient will have a decrease in pain to facilitate improvement in movement, function, and ADLs as indicated by Functional Deficits. [] Progressing: [] Met: [] Not Met: [] Adjusted    Long Term Goals: To be achieved in: 6 weeks  1. Disability index score of 79% or more per FOTO to assist with reaching prior level of function. [] Progressing: [] Met: [] Not Met: [] Adjusted  2. Patient will demonstrate increased AROM to 120 R knee flexion to allow for proper joint functioning as indicated by patients Functional Deficits. [] Progressing: [] Met: [] Not Met: [] Adjusted  3. Patient will demonstrate an increase in Strength to good proximal hip strength and control, within 5lb HHD in LE to allow for proper functional mobility as indicated by patients Functional Deficits. [] Progressing: [] Met: [] Not Met: [] Adjusted  4. Patient will return to sitting, transfers sit<>stand and walking functional activities without increased symptoms or restriction. [] Progressing: [] Met: [] Not Met: [] Adjusted  5. Pt.  To be able to climb stairs reciprocally without pain(patient specific functional goal)    [] Progressing: [] Met: [] Not Met: [] Adjusted     Progression Towards Functional goals:  [] Patient is progressing as expected towards functional goals listed. [x] Progression is slowed due to complexities listed. [] Progression has been slowed due to co-morbidities. [] Plan just implemented, too soon to assess goals progression  [] Other:         Overall Progression Towards Functional goals/ Treatment Progress Update:  [] Patient is progressing as expected towards functional goals listed. [] Progression is slowed due to complexities/Impairments listed. [] Progression has been slowed due to co-morbidities. [x] Plan just implemented, too soon to assess goals progression <30days   [] Goals require adjustment due to lack of progress  [] Patient is not progressing as expected and requires additional follow up with physician  [] Other    Prognosis for POC: [x] Good [] Fair  [] Poor      Patient requires continued skilled intervention: [x] Yes  [] No    Treatment/Activity Tolerance:  [x] Patient able to complete treatment  [] Patient limited by fatigue  [] Patient limited by pain    [] Patient limited by other medical complications  [] Other:     ASSESSMENT: pt. Is progressing with R knee ROM and quad strength, he is tolerating progression to closed chain ex. Well. Still requires moderate cues for form with newer exercises. I would recommend continued PT to further progress with functional ex. To maximize pt. Outcome. Return to Play: (if applicable)   []  Stage 1: Intro to Strength   []  Stage 2: Return to Run and Strength   []  Stage 3: Return to Jump and Strength   []  Stage 4: Dynamic Strength and Agility   []  Stage 5: Sport Specific Training     []  Ready to Return to Play, Meets All Above Stages   []  Not Ready for Return to Sports   Comments:                               PLAN: See eval. Progress CKC exercises as tolerated.   [x] Continue per plan of care [] Alter current plan (see comments above)  [] Plan of care initiated [] Hold pending MD visit [] Discharge      Electronically signed by:  Araceli Irene PT,  MSPT, OMT-C [id number]        Note: If patient does not return for scheduled/ recommended follow up visits, this note will serve as a discharge from care along with most recent update on progress.

## 2022-08-22 ENCOUNTER — OFFICE VISIT (OUTPATIENT)
Dept: ORTHOPEDIC SURGERY | Age: 62
End: 2022-08-22
Payer: COMMERCIAL

## 2022-08-22 VITALS — WEIGHT: 240 LBS | HEIGHT: 68 IN | BODY MASS INDEX: 36.37 KG/M2

## 2022-08-22 DIAGNOSIS — M22.41 CHONDROMALACIA PATELLAE OF RIGHT KNEE: ICD-10-CM

## 2022-08-22 DIAGNOSIS — M17.11 PRIMARY OSTEOARTHRITIS OF RIGHT KNEE: Primary | ICD-10-CM

## 2022-08-22 DIAGNOSIS — M25.561 RIGHT KNEE PAIN, UNSPECIFIED CHRONICITY: ICD-10-CM

## 2022-08-22 PROCEDURE — 99213 OFFICE O/P EST LOW 20 MIN: CPT | Performed by: FAMILY MEDICINE

## 2022-08-22 NOTE — PROGRESS NOTES
Chief Complaint  Knee Pain (CK R KNEE)      FU progressively worsening right anterior medial knee pain with significant bone-on-bone changes medial compartment and symptomatic patellofemoral arthropathy with synovitis    History of Present Illness:  Marleny Mario is a 58 y.o. male who is a very pleasant weight male who is a  for [employer] and does play recreational [employer] ball quite frequently and walk and per the patient is a prediabetic on metformin and is hoping to lose weight and states that he has had episodic pain to his right knee for the past several years but more of a dull and achy but states that in early July 2022 became much more problematic with increased pain and swelling and actively limping. He initially treated himself with some ice and Tylenol but was eventually evaluated in after-hours on 7/18/2020 was placed on Medrol pack which is helped 60 to 70%. He was given a wraparound brace which she was using incorrectly. It is more of a soreness and stiffness both anteriorly and medially and was most problematic with sitting for prolonged period getting up such as getting up from his desk and going up and down stairs and walking on uneven surfaces. He did have x-rays showing significant medial compartment arthropathy but has no previous history of surgery. His major goal is to continue to walk and exercise to help lose weight and control his sugars. He is being seen today for orthopedic and sports consultation with imaging. He has not been on anti-inflammatories    Rosalba Barajas was initially evaluated for his right knee in the office on 7/25/2022 and was found to have fairly substantial bone-on-bone changes to the medial compartment with patellofemoral arthropathy. With conservative treatment, he has improved 75 to 80% and has no longer having the constant pain. It is more aggravating and sore but is not having substantial rest or night pain.   He has improved with regard of swelling and denies locking or catching. He does get benefit from utilizing his brace and feels more stable and feels as if he is getting stronger with physical therapy. He has had 4 sessions thus far and has been good about performing his home-based exercises. No locking or high-grade night pain. He describes the current symptoms as more irritating as opposed to highly limiting. Medical History     Patient's medications, allergies, past medical, surgical, social and family histories were reviewed and updated as appropriate. Review of Systems  Pertinent items are noted in HPI  Review of systems reviewed from Patient History Form dated on 7/25/2022 and available in the patient's chart under the Media tab. Vital Signs  There were no vitals filed for this visit. General Exam:     Constitutional: Patient is adequately groomed with no evidence of malnutrition  DTRs: Deep tendon reflexes are intact  Mental Status: The patient is oriented to time, place and person. The patient's mood and affect are appropriate. Lymphatic: The lymphatic examination bilaterally reveals all areas to be without enlargement or induration. Vascular: Examination reveals no swelling or calf tenderness. Peripheral pulses are palpable and 2+. Neurological: The patient has good coordination. There is no weakness or sensory deficit. Knee Examination  Inspection: There is no evidence of high-grade deformities although he does have some patellofemoral crepitation    Palpation: He has much less tenderness over the medial and lateral patellofemoral facet and improvements in his residual pain with patellar grind testing and some mild pain with crepitation with medial Byron's remarkable for crepitation. Negative lateral Byron's. Rang of Motion: Stiff in terminal 5 degrees of extension with flexion to about 115. Hamstrings are tight. Strength: 4 Plus out of 5 with knee flexion extension.     Special Tests: He exhibits much less pain with patellar grind testing and a Byron's with rotation. Not since high-grade instability. Skin: There are no rashes, ulcerations or lesions. Distal neurovascular exam is intact. Gait: Reasonable gait. Reflex symmetrically preserved    Additional Comments:     Additional Examinations:  Contralateral Exam: Examination of the left knee reveals intact skin. There is no focal tenderness. The patient demonstrates full painless range of motion with regards to flexion and extension. Strength is 5/5 thorough out all planes. Ligamentous stability is grossly intact. Right Lower Extremity: Examination of the right lower extremity does not show any tenderness, deformity or injury. Range of motion is unremarkable. There is no gross instability. There are no rashes, ulcerations or lesions. Strength and tone are normal.  Left Lower Extremity: Examination of the left lower extremity does not show any tenderness, deformity or injury. Range of motion is unremarkable. There is no gross instability. There are no rashes, ulcerations or lesions. Strength and tone are normal.      Diagnostic Test Findings: Right knee AP and PA weightbearing sunrise and lateral films were obtained 7/8/2022 and does show advanced affected bone-on-bone changes to the medial compartment with fairly prominent patellofemoral arthropathy with spurring      Assessment : #1. Symptomatically mproving right knee pain with advanced effective bone-on-bone changes medical Daune Estimable with patellofemoral neuropathy and improved synovitis    Impression:  Encounter Diagnoses   Name Primary?     Primary osteoarthritis of right knee Yes    Chondromalacia patellae of right knee     Right knee pain, unspecified chronicity        Office Procedures:  Orders Placed This Encounter   Procedures    EUFLEXXA INJ PER DOSE     RIGHT KNEE     Standing Status:   Future     Standing Expiration Date:   8/22/2023       Treatment Plan: Treatment options were discussed with Ashley Muhammad. We did this again review his plain films and exam findings. He does have quite prominent medial compartment osteoarthritis and significant patellofemoral arthropathy with spurring. He was quite symptomatic last month and with conservative treatment, his symptoms have improved 75 to 80%. He is progressing through physical therapy having 4 sessions thus far we will continue his exercise program and use of his brace. I recommend he continues with his anti-inflammatory medications with meloxicam 15 mg daily and potential for incorporating viscosupplementation was discussed however he does have anthem. Icing and activity modification was discussed. We will see him back in a few weeks to consider this and he will contact us with questions or concerns. Icing and activity modification use of his brace recommended. This dictation was performed with a verbal recognition program (DRAGON) and it was checked for errors. It is possible that there are still dictated errors within this office note. If so, please bring any errors to my attention for an addendum. All efforts were made to ensure that this office note is accurate.

## 2022-08-23 ENCOUNTER — HOSPITAL ENCOUNTER (OUTPATIENT)
Dept: PHYSICAL THERAPY | Age: 62
Setting detail: THERAPIES SERIES
Discharge: HOME | End: 2022-08-23
Payer: COMMERCIAL

## 2022-08-23 PROCEDURE — 97112 NEUROMUSCULAR REEDUCATION: CPT | Performed by: PHYSICAL THERAPIST

## 2022-08-23 NOTE — FLOWSHEET NOTE
Ankle PF        Ankle DF        Ankle In        Ankle Ev        Strength LEFT RIGHT    HIP Flexors   4+ 4+   HIP Abductors   4+    HIP Ext        Hip ER        Knee EXT (quad)   Quad tone F Quad tone G, knee ext 5-   Knee Flex (HS)   4+ 5        RESTRICTIONS/PRECAUTIONS:     Exercises/Interventions:     Access Code: [email or web address]  URL: [email or web address]  Date: 08/23/2022  Prepared by: Brandin Smith    Exercises  Standing Hamstring Stretch with Step - 1 x daily - 7 x weekly - 1 sets - 3-4 reps - 30 hold  Standing Hip Flexor Stretch - 1 x daily - 7 x weekly - 10 reps - 10 hold  Gastroc Stretch on Wall - 1 x daily - 7 x weekly - 1 sets - 4 reps - 30 hold  Seated Straight Leg Raise with Support - 1 x daily - 7 x weekly - 1-3 sets - 10 reps  Seated Long Arc Quad - 1 x daily - 7 x weekly - 1-3 sets - 10 reps  Side Stepping with Resistance at Ankles - 1 x daily - 7 x weekly - 1-3 sets - 10 reps  Standing Tandem Balance with Counter Support - 1 x daily - 7 x weekly - 5 reps - 10-15 hold      Therapeutic Ex (48756) Sets/sec Reps Notes/CUES   seated hamstring stretch :25 4    Incline gastroc stretch :30 3    True stretch, ITB R 1 10    Supine KTOS    SLR 1#   QS with roll HEP     SAQ with ball squeeze 3#    LAQ       SL clams 3# HEP  + to HEP   SL abd Focus on form         Bike  6'  At end of treatment session         Manual Intervention ([phone])      Patellar mobs/tibial mobs      ITB stretch supine                            NMR re-education (45214)   CUES NEEDED   Pt. Ed:re findings, anatomy, progosis, HEP progression, PPP strategies   Discussed benefits of exercise in treating OA.          Tandem balance R back-airex :15 4    FSU 4\"  2 10 Still pain with 6\"         Side stepping at wall HEP  No resistance  YTB   SUE CCTKE :5 25 60#   SUE abd 2 10 B 30#         # 1 15    Ecc LP R 80# 1 15                                  Therapeutic Exercise and NMR EXR  [x] (75171) Provided verbal/tactile cueing for activities related to strengthening, flexibility, endurance, ROM for improvements in LE, proximal hip, and core control with self care, mobility, lifting, ambulation.  [] (25667) Provided verbal/tactile cueing for activities related to improving balance, coordination, kinesthetic sense, posture, motor skill, proprioception  to assist with LE, proximal hip, and core control in self care, mobility, lifting, ambulation and eccentric single leg control.      NMR and Therapeutic Activities:    [x] (11621 or 82716) Provided verbal/tactile cueing for activities related to improving balance, coordination, kinesthetic sense, posture, motor skill, proprioception and motor activation to allow for proper function of core, proximal hip and LE with self care and ADLs  [] (62946) Gait Re-education- Provided training and instruction to the patient for proper LE, core and proximal hip recruitment and positioning and eccentric body weight control with ambulation re-education including up and down stairs     Home Exercise Program:    [x] (41570) Reviewed/Progressed HEP activities related to strengthening, flexibility, endurance, ROM of core, proximal hip and LE for functional self-care, mobility, lifting and ambulation/stair navigation   [] (67490)Reviewed/Progressed HEP activities related to improving balance, coordination, kinesthetic sense, posture, motor skill, proprioception of core, proximal hip and LE for self care, mobility, lifting, and ambulation/stair navigation      Manual Treatments:  PROM / STM / Oscillations-Mobs:  G-I, II, III, IV (PA's, Inf., Post.)  [x] (24014) Provided manual therapy to mobilize LE, proximal hip and/or LS spine soft tissue/joints for the purpose of modulating pain, promoting relaxation,  increasing ROM, reducing/eliminating soft tissue swelling/inflammation/restriction, improving soft tissue extensibility and allowing for proper ROM for normal function with self care, mobility, lifting and ambulation. Modalities:     [] GAME READY (VASO)- for significant edema, swelling, pain control. Charges  Timed Code Treatment Minutes: 22'   Total Treatment Minutes: 40'(with bike and stretching)     Medicare total (add KX at $2000)         BWC time in/ time out:    TE time in /out    Manual time in/out    Neuro re ed time in/out    Untimed minutes        [] EVAL (LOW) 89932   [] EVAL (MOD) 67291  [] EVAL (HIGH) 98485   [] RE-EVAL     [] CA(62640) x     [] IONTO  [x] NMR (66200) x   1  [] VASO  [] Manual (15240) x      [] Other:  [] TA x      [] Mech Traction (26148)  [] ES(attended) (11039)      [] ES (un) (01414):       GOALS:   Patient stated goal: Managing knee problems  [] Progressing: [] Met: [] Not Met: [] Adjusted    Therapist goals for Patient:   Short Term Goals: To be achieved in: 2 weeks  1. Independent in HEP and progression per patient tolerance, in order to prevent re-injury. [] Progressing: [] Met: [] Not Met: [] Adjusted  2. Patient will have a decrease in pain to facilitate improvement in movement, function, and ADLs as indicated by Functional Deficits. [] Progressing: [] Met: [] Not Met: [] Adjusted    Long Term Goals: To be achieved in: 6 weeks  1. Disability index score of 79% or more per FOTO to assist with reaching prior level of function. [] Progressing: [] Met: [] Not Met: [] Adjusted  2. Patient will demonstrate increased AROM to 120 R knee flexion to allow for proper joint functioning as indicated by patients Functional Deficits. [] Progressing: [] Met: [] Not Met: [] Adjusted  3. Patient will demonstrate an increase in Strength to good proximal hip strength and control, within 5lb HHD in LE to allow for proper functional mobility as indicated by patients Functional Deficits. [] Progressing: [] Met: [] Not Met: [] Adjusted  4. Patient will return to sitting, transfers sit<>stand and walking functional activities without increased symptoms or restriction.    [] Progressing: [] Met: [] Not Met: [] Adjusted  5. Pt. To be able to climb stairs reciprocally without pain(patient specific functional goal)    [] Progressing: [] Met: [] Not Met: [] Adjusted     Progression Towards Functional goals:  [] Patient is progressing as expected towards functional goals listed. [x] Progression is slowed due to complexities listed. [] Progression has been slowed due to co-morbidities. [] Plan just implemented, too soon to assess goals progression  [] Other:         Overall Progression Towards Functional goals/ Treatment Progress Update:  [] Patient is progressing as expected towards functional goals listed. [] Progression is slowed due to complexities/Impairments listed. [] Progression has been slowed due to co-morbidities. [x] Plan just implemented, too soon to assess goals progression <30days   [] Goals require adjustment due to lack of progress  [] Patient is not progressing as expected and requires additional follow up with physician  [] Other    Prognosis for POC: [x] Good [] Fair  [] Poor      Patient requires continued skilled intervention: [x] Yes  [] No    Treatment/Activity Tolerance:  [x] Patient able to complete treatment  [] Patient limited by fatigue  [] Patient limited by pain    [] Patient limited by other medical complications  [] Other:     ASSESSMENT: pt. Has not been consistent with performance of HEP. Reformatted HEP to allow for performance of ex throughout work day in sitting or standing. No increased pain with ex. Today, challenged with muscle fatigue. Still not able to perform step up on 6\" box, but was able to add ecc quad with LP. Pt requires skilled intervention to restore ROM, strength, functional endurance and balance in order to perform ADLs without significant symptoms or limitations.      Return to Play: (if applicable)   []  Stage 1: Intro to Strength   []  Stage 2: Return to Run and Strength   []  Stage 3: Return to Jump and Strength   []  Stage 4: Dynamic Strength and Agility   []  Stage 5: Sport Specific Training     []  Ready to Return to Play, Meets All Above Stages   []  Not Ready for Return to Sports   Comments:                               PLAN: See eval. Progress CKC exercises as tolerated. [x] Continue per plan of care [] Alter current plan (see comments above)  [] Plan of care initiated [] Hold pending MD visit [] Discharge      Electronically signed by:  Dain Garrison, PT,  MSPT, OMT-C [id number]        Note: If patient does not return for scheduled/ recommended follow up visits, this note will serve as a discharge from care along with most recent update on progress.

## 2022-08-26 ENCOUNTER — HOSPITAL ENCOUNTER (OUTPATIENT)
Dept: PHYSICAL THERAPY | Age: 62
Setting detail: THERAPIES SERIES
Discharge: HOME | End: 2022-08-26
Payer: COMMERCIAL

## 2022-08-26 PROCEDURE — 97112 NEUROMUSCULAR REEDUCATION: CPT | Performed by: PHYSICAL THERAPIST

## 2022-08-26 NOTE — FLOWSHEET NOTE
[facility],  [address]  Phone: [phone]  Fax [phone]    Physical Therapy Treatment Note/ Progress Report:           Date:  2022    Patient Name:  Astrid Bob    :  1960  MRN: [id number]  Restrictions/Precautions:    Medical/Treatment Diagnosis Information:  Diagnosis: M17.11 (ICD-10-CM) - Primary osteoarthritis of right knee  M22.41 (ICD-10-CM) - Chondromalacia patellae of right knee  M65.9 (ICD-10-CM) - Synovitis of right knee   Treatment diagnosis:Right knee pain M25.561                                         Insurance/Certification information:     Physician Information:   Dionicio Caputo MD        Has the plan of care been signed (Y/N):        []  Yes  [x]  No     Date of Patient follow up with Physician: 22      Is this a Progress Report:     []  Yes  [x]  No        If Yes:  Date Range for reporting period:  Beginning 22  Ending    Progress report will be due (10 Rx or 30 days whichever is less): 89       Recertification will be due (POC Duration  / 90 days whichever is less): 6 weeks      Visit # Insurance Allowable Auth Required   In-person 7 BCBS/ 60 visits/high deductible []  Yes [x]  No    Telehealth   []  Yes []  No    Total          Functional Scale: FOTO 67/100    Date assessed:  22    FOTO 64/100       22      Therapy Diagnosis/Practice Pattern:E      Number of Comorbidities:  []0     [x]1-2    []3+    Latex Allergy:  [x]NO      []YES  Preferred Language for Healthcare:   [x]English       []other:      Pain level:  2/10-more stiff than pain     SUBJECTIVE: pt. Reports that he is ok today, the knee is feeling stiff, was feeling it more on Wednesday and wore the brace.     OBJECTIVE:     Observation:  Test measurements:  knee flex: 125 A, 130 P    ROM LEFT RIGHT current   HIP Flex 80 80    HIP Abd        HIP Ext        HIP IR        HIP ER        Knee ext 0 0 0   Knee Flex 115 100 125 A/ 130 P   Ankle PF        Ankle DF        Ankle In        Ankle Ev        Strength LEFT RIGHT    HIP Flexors   4+ 4+   HIP Abductors   4+    HIP Ext        Hip ER        Knee EXT (quad)   Quad tone F Quad tone G, knee ext 5-   Knee Flex (HS)   4+ 5        RESTRICTIONS/PRECAUTIONS:     Exercises/Interventions:     Access Code: [email or web address]  URL: [email or web address]  Date: 08/23/2022  Prepared by: Maldonado Sauceda    Exercises  Standing Hamstring Stretch with Step - 1 x daily - 7 x weekly - 1 sets - 3-4 reps - 30 hold  Standing Hip Flexor Stretch - 1 x daily - 7 x weekly - 10 reps - 10 hold  Gastroc Stretch on Wall - 1 x daily - 7 x weekly - 1 sets - 4 reps - 30 hold  Seated Straight Leg Raise with Support - 1 x daily - 7 x weekly - 1-3 sets - 10 reps  Seated Long Arc Quad - 1 x daily - 7 x weekly - 1-3 sets - 10 reps  Side Stepping with Resistance at Ankles - 1 x daily - 7 x weekly - 1-3 sets - 10 reps  Standing Tandem Balance with Counter Support - 1 x daily - 7 x weekly - 5 reps - 10-15 hold      Therapeutic Ex (85986) Sets/sec Reps Notes/CUES   seated hamstring stretch :25 4    Incline gastroc stretch :30 3    True stretch, ITB R 1 10    Supine KTOS    SLR 1#   QS with roll HEP     SAQ with ball squeeze 3#    LAQ       SL clams 3# HEP  + to HEP   SL abd Focus on form         Bike  6'  At end of treatment session         Manual Intervention (88223)      Patellar mobs/tibial mobs      ITB stretch supine    Distal ITB CFM/stick 4'                       NMR re-education (21854)   CUES NEEDED   Pt. Ed:re findings, anatomy, progosis, HEP progression, PPP strategies   Discussed benefits of exercise in treating OA.          Tandem balance R back-airex :15 4    FSU 4\"  1 15 Still pain with 6\"         Side stepping at wall HEP  No resistance  YTB   SUE CCTKE :5 25 60#   SUE abd 2 10 B 30#         # 2 10    Ecc LP R 90# 1 15                                  Therapeutic Exercise and NMR EXR  [x] (47767) Provided verbal/tactile cueing for activities related to strengthening, flexibility, endurance, ROM for improvements in LE, proximal hip, and core control with self care, mobility, lifting, ambulation.  [] (21957) Provided verbal/tactile cueing for activities related to improving balance, coordination, kinesthetic sense, posture, motor skill, proprioception  to assist with LE, proximal hip, and core control in self care, mobility, lifting, ambulation and eccentric single leg control.      NMR and Therapeutic Activities:    [x] (72627 or 31071) Provided verbal/tactile cueing for activities related to improving balance, coordination, kinesthetic sense, posture, motor skill, proprioception and motor activation to allow for proper function of core, proximal hip and LE with self care and ADLs  [] (88703) Gait Re-education- Provided training and instruction to the patient for proper LE, core and proximal hip recruitment and positioning and eccentric body weight control with ambulation re-education including up and down stairs     Home Exercise Program:    [x] (09242) Reviewed/Progressed HEP activities related to strengthening, flexibility, endurance, ROM of core, proximal hip and LE for functional self-care, mobility, lifting and ambulation/stair navigation   [] (19497)Reviewed/Progressed HEP activities related to improving balance, coordination, kinesthetic sense, posture, motor skill, proprioception of core, proximal hip and LE for self care, mobility, lifting, and ambulation/stair navigation      Manual Treatments:  PROM / STM / Oscillations-Mobs:  G-I, II, III, IV (PA's, Inf., Post.)  [x] (35256) Provided manual therapy to mobilize LE, proximal hip and/or LS spine soft tissue/joints for the purpose of modulating pain, promoting relaxation,  increasing ROM, reducing/eliminating soft tissue swelling/inflammation/restriction, improving soft tissue extensibility and allowing for proper ROM for normal function with self care, mobility, lifting and ambulation. Modalities:     [] GAME READY (VASO)- for significant edema, swelling, pain control. Charges  Timed Code Treatment Minutes: 22'   Total Treatment Minutes: 40'(with bike and stretching)     Medicare total (add KX at $2000)         BWC time in/ time out:    TE time in /out    Manual time in/out    Neuro re ed time in/out    Untimed minutes        [] EVAL (LOW) 28763   [] EVAL (MOD) 11796  [] EVAL (HIGH) 52241   [] RE-EVAL     [] BD(73063) x     [] IONTO  [x] NMR (09620) x   1  [] VASO  [] Manual (48608) x      [] Other:  [] TA x      [] Mech Traction (99777)  [] ES(attended) (14516)      [] ES (un) (36913):       GOALS:   Patient stated goal: Managing knee problems  [] Progressing: [] Met: [] Not Met: [] Adjusted    Therapist goals for Patient:   Short Term Goals: To be achieved in: 2 weeks  1. Independent in HEP and progression per patient tolerance, in order to prevent re-injury. [] Progressing: [] Met: [] Not Met: [] Adjusted  2. Patient will have a decrease in pain to facilitate improvement in movement, function, and ADLs as indicated by Functional Deficits. [] Progressing: [] Met: [] Not Met: [] Adjusted    Long Term Goals: To be achieved in: 6 weeks  1. Disability index score of 79% or more per FOTO to assist with reaching prior level of function. [] Progressing: [] Met: [] Not Met: [] Adjusted  2. Patient will demonstrate increased AROM to 120 R knee flexion to allow for proper joint functioning as indicated by patients Functional Deficits. [] Progressing: [] Met: [] Not Met: [] Adjusted  3. Patient will demonstrate an increase in Strength to good proximal hip strength and control, within 5lb HHD in LE to allow for proper functional mobility as indicated by patients Functional Deficits. [] Progressing: [] Met: [] Not Met: [] Adjusted  4.  Patient will return to sitting, transfers sit<>stand and walking functional activities without increased symptoms or restriction. [] Progressing: [] Met: [] Not Met: [] Adjusted  5. Pt. To be able to climb stairs reciprocally without pain(patient specific functional goal)    [] Progressing: [] Met: [] Not Met: [] Adjusted     Progression Towards Functional goals:  [] Patient is progressing as expected towards functional goals listed. [x] Progression is slowed due to complexities listed. [] Progression has been slowed due to co-morbidities. [] Plan just implemented, too soon to assess goals progression  [] Other:         Overall Progression Towards Functional goals/ Treatment Progress Update:  [] Patient is progressing as expected towards functional goals listed. [] Progression is slowed due to complexities/Impairments listed. [] Progression has been slowed due to co-morbidities. [x] Plan just implemented, too soon to assess goals progression <30days   [] Goals require adjustment due to lack of progress  [] Patient is not progressing as expected and requires additional follow up with physician  [] Other    Prognosis for POC: [x] Good [] Fair  [] Poor      Patient requires continued skilled intervention: [x] Yes  [] No    Treatment/Activity Tolerance:  [x] Patient able to complete treatment  [] Patient limited by fatigue  [] Patient limited by pain    [] Patient limited by other medical complications  [] Other:     ASSESSMENT: pt. Reports that he did not do his exercises on Wednesday, due to feeling swollen, and was too busy yesterday. Re enforced the need to perform HEP in order to advance with strength and function. He is tolerating the ex. Well and we were able to increase resistance today.     Return to Play: (if applicable)   []  Stage 1: Intro to Strength   []  Stage 2: Return to Run and Strength   []  Stage 3: Return to Jump and Strength   []  Stage 4: Dynamic Strength and Agility   []  Stage 5: Sport Specific Training     []  Ready to Return to Play, Meets All Above Stages   []  Not Ready for Return to Sports   Comments:                               PLAN: See eval. Progress CKC exercises as tolerated. [x] Continue per plan of care [] Alter current plan (see comments above)  [] Plan of care initiated [] Hold pending MD visit [] Discharge      Electronically signed by:  Nisreen Lewis PT,  MSPT, OMT-C [id number]        Note: If patient does not return for scheduled/ recommended follow up visits, this note will serve as a discharge from care along with most recent update on progress.

## 2022-08-30 ENCOUNTER — HOSPITAL ENCOUNTER (OUTPATIENT)
Dept: PHYSICAL THERAPY | Age: 62
Setting detail: THERAPIES SERIES
Discharge: HOME | End: 2022-08-30
Payer: COMMERCIAL

## 2022-08-30 PROCEDURE — 97112 NEUROMUSCULAR REEDUCATION: CPT | Performed by: PHYSICAL THERAPIST

## 2022-08-30 NOTE — FLOWSHEET NOTE
[facility],  [address]  Phone: [phone]  Fax [phone]    Physical Therapy Treatment Note/ Progress Report:           Date:  2022    Patient Name:  Marleny Mario    :  1960  MRN: [id number]  Restrictions/Precautions:    Medical/Treatment Diagnosis Information:  Diagnosis: M17.11 (ICD-10-CM) - Primary osteoarthritis of right knee  M22.41 (ICD-10-CM) - Chondromalacia patellae of right knee  M65.9 (ICD-10-CM) - Synovitis of right knee   Treatment diagnosis:Right knee pain M25.561                                         Insurance/Certification information:     Physician Information:   Suyapa Givens MD        Has the plan of care been signed (Y/N):        []  Yes  [x]  No     Date of Patient follow up with Physician: 22      Is this a Progress Report:     []  Yes  [x]  No        If Yes:  Date Range for reporting period:  Beginning 22  Ending    Progress report will be due (10 Rx or 30 days whichever is less): 21       Recertification will be due (POC Duration  / 90 days whichever is less): 6 weeks      Visit # Insurance Allowable Auth Required   In-person 8 BCBS/ 60 visits/high deductible []  Yes [x]  No    Telehealth   []  Yes []  No    Total          Functional Scale: FOTO 67/100    Date assessed:  22    FOTO 64/100       22      Therapy Diagnosis/Practice Pattern:E      Number of Comorbidities:  []0     [x]1-2    []3+    Latex Allergy:  [x]NO      []YES  Preferred Language for Healthcare:   [x]English       []other:      Pain level:  2/10-more stiff than pain     SUBJECTIVE: pt. Reports that he is ok today  OBJECTIVE:     Observation:  Test measurements:    ROM LEFT RIGHT current   HIP Flex 80 80    HIP Abd        HIP Ext        HIP IR        HIP ER        Knee ext 0 0 0   Knee Flex 115 100 125 A/ 130 P   Ankle PF        Ankle DF        Ankle In        Ankle Ev Strength LEFT RIGHT    HIP Flexors   4+ 4+   HIP Abductors   4+    HIP Ext        Hip ER        Knee EXT (quad)   Quad tone F Quad tone G, knee ext 5-   Knee Flex (HS)   4+ 5        RESTRICTIONS/PRECAUTIONS:     Exercises/Interventions:     Access Code: [email or web address]  URL: [email or web address]  Date: 08/23/2022  Prepared by: Angela Rivera    Exercises  Standing Hamstring Stretch with Step - 1 x daily - 7 x weekly - 1 sets - 3-4 reps - 30 hold  Standing Hip Flexor Stretch - 1 x daily - 7 x weekly - 10 reps - 10 hold  Gastroc Stretch on Wall - 1 x daily - 7 x weekly - 1 sets - 4 reps - 30 hold  Seated Straight Leg Raise with Support - 1 x daily - 7 x weekly - 1-3 sets - 10 reps  Seated Long Arc Quad - 1 x daily - 7 x weekly - 1-3 sets - 10 reps  Side Stepping with Resistance at Ankles - 1 x daily - 7 x weekly - 1-3 sets - 10 reps  Standing Tandem Balance with Counter Support - 1 x daily - 7 x weekly - 5 reps - 10-15 hold      Therapeutic Ex (40528) Sets/sec Reps Notes/CUES   seated hamstring stretch :25 4    Incline gastroc stretch :30 3    True stretch, ITB R 2 10    Supine KTOS    SLR 1#   QS with roll HEP     SAQ with ball squeeze 3#    LAQ       SL clams 3# HEP  + to HEP   SL abd Focus on form         Bike  8'  At end of treatment session         Manual Intervention ([phone])      Patellar mobs/tibial mobs      ITB stretch supine    Distal ITB CFM/stick                        NMR re-education (50555)   CUES NEEDED   Pt. Ed:re findings, anatomy, progosis, HEP progression, PPP strategies   Discussed benefits of exercise in treating OA.          Tandem balance R back-airex    FSU 4\" + airex 1 15 Still pain with 6\"         Side stepping at wall HEP  No resistance  YTB   SUE CCTKE :5 25 B 60#   SUE abd 2 10 B 30#         # 3 10    Ecc LP R 90# 1 20    B HR                              Therapeutic Exercise and NMR EXR  [x] (55361) Provided verbal/tactile cueing for activities related to strengthening, flexibility, endurance, ROM for improvements in LE, proximal hip, and core control with self care, mobility, lifting, ambulation.  [] (95161) Provided verbal/tactile cueing for activities related to improving balance, coordination, kinesthetic sense, posture, motor skill, proprioception  to assist with LE, proximal hip, and core control in self care, mobility, lifting, ambulation and eccentric single leg control. NMR and Therapeutic Activities:    [x] (54388 or 88770) Provided verbal/tactile cueing for activities related to improving balance, coordination, kinesthetic sense, posture, motor skill, proprioception and motor activation to allow for proper function of core, proximal hip and LE with self care and ADLs  [] (52950) Gait Re-education- Provided training and instruction to the patient for proper LE, core and proximal hip recruitment and positioning and eccentric body weight control with ambulation re-education including up and down stairs     Home Exercise Program:    [x] (84558) Reviewed/Progressed HEP activities related to strengthening, flexibility, endurance, ROM of core, proximal hip and LE for functional self-care, mobility, lifting and ambulation/stair navigation   [] (05034)Reviewed/Progressed HEP activities related to improving balance, coordination, kinesthetic sense, posture, motor skill, proprioception of core, proximal hip and LE for self care, mobility, lifting, and ambulation/stair navigation      Manual Treatments:  PROM / STM / Oscillations-Mobs:  G-I, II, III, IV (PA's, Inf., Post.)  [x] (26829) Provided manual therapy to mobilize LE, proximal hip and/or LS spine soft tissue/joints for the purpose of modulating pain, promoting relaxation,  increasing ROM, reducing/eliminating soft tissue swelling/inflammation/restriction, improving soft tissue extensibility and allowing for proper ROM for normal function with self care, mobility, lifting and ambulation.      Modalities:     [] GAME READY (VASO)- for significant edema, swelling, pain control. Charges  Timed Code Treatment Minutes: 22'   Total Treatment Minutes: 40'(with bike and stretching)     Medicare total (add KX at $2000)         BWC time in/ time out:    TE time in /out    Manual time in/out    Neuro re ed time in/out    Untimed minutes        [] EVAL (LOW) 34541   [] EVAL (MOD) 06239  [] EVAL (HIGH) 51954   [] RE-EVAL     [] KH(94316) x     [] IONTO  [x] NMR (08320) x   1  [] VASO  [] Manual (88833) x      [] Other:  [] TA x      [] Mech Traction (35532)  [] ES(attended) (35829)      [] ES (un) (40000):       GOALS:   Patient stated goal: Managing knee problems  [] Progressing: [] Met: [] Not Met: [] Adjusted    Therapist goals for Patient:   Short Term Goals: To be achieved in: 2 weeks  1. Independent in HEP and progression per patient tolerance, in order to prevent re-injury. [] Progressing: [] Met: [] Not Met: [] Adjusted  2. Patient will have a decrease in pain to facilitate improvement in movement, function, and ADLs as indicated by Functional Deficits. [] Progressing: [] Met: [] Not Met: [] Adjusted    Long Term Goals: To be achieved in: 6 weeks  1. Disability index score of 79% or more per FOTO to assist with reaching prior level of function. [] Progressing: [] Met: [] Not Met: [] Adjusted  2. Patient will demonstrate increased AROM to 120 R knee flexion to allow for proper joint functioning as indicated by patients Functional Deficits. [] Progressing: [] Met: [] Not Met: [] Adjusted  3. Patient will demonstrate an increase in Strength to good proximal hip strength and control, within 5lb HHD in LE to allow for proper functional mobility as indicated by patients Functional Deficits. [] Progressing: [] Met: [] Not Met: [] Adjusted  4. Patient will return to sitting, transfers sit<>stand and walking functional activities without increased symptoms or restriction. [] Progressing: [] Met: [] Not Met: [] Adjusted  5. Pt.  To be able to climb stairs reciprocally without pain(patient specific functional goal)    [] Progressing: [] Met: [] Not Met: [] Adjusted     Progression Towards Functional goals:  [] Patient is progressing as expected towards functional goals listed. [x] Progression is slowed due to complexities listed. [] Progression has been slowed due to co-morbidities. [] Plan just implemented, too soon to assess goals progression  [] Other:         Overall Progression Towards Functional goals/ Treatment Progress Update:  [] Patient is progressing as expected towards functional goals listed. [x] Progression is slowed due to complexities/Impairments listed. [] Progression has been slowed due to co-morbidities. [] Plan just implemented, too soon to assess goals progression <30days   [] Goals require adjustment due to lack of progress  [] Patient is not progressing as expected and requires additional follow up with physician  [] Other    Prognosis for POC: [x] Good [] Fair  [] Poor      Patient requires continued skilled intervention: [x] Yes  [] No    Treatment/Activity Tolerance:  [x] Patient able to complete treatment  [] Patient limited by fatigue  [] Patient limited by pain    [] Patient limited by other medical complications  [] Other:     ASSESSMENT: able to progress resistance with weight machine exercises today. Return to Play: (if applicable)   []  Stage 1: Intro to Strength   []  Stage 2: Return to Run and Strength   []  Stage 3: Return to Jump and Strength   []  Stage 4: Dynamic Strength and Agility   []  Stage 5: Sport Specific Training     []  Ready to Return to Play, Meets All Above Stages   []  Not Ready for Return to Sports   Comments:                               PLAN: See eval. Progress CKC exercises as tolerated.   [x] Continue per plan of care [] Alter current plan (see comments above)  [] Plan of care initiated [] Hold pending MD visit [] Discharge      Electronically signed by:  César Ho PT,  MSPT, OMT-C [id number]        Note: If patient does not return for scheduled/ recommended follow up visits, this note will serve as a discharge from care along with most recent update on progress.

## 2022-09-02 ENCOUNTER — HOSPITAL ENCOUNTER (OUTPATIENT)
Dept: PHYSICAL THERAPY | Age: 62
Setting detail: THERAPIES SERIES
Discharge: HOME | End: 2022-09-02
Payer: COMMERCIAL

## 2022-09-02 PROCEDURE — 97112 NEUROMUSCULAR REEDUCATION: CPT | Performed by: PHYSICAL THERAPIST

## 2022-09-02 NOTE — FLOWSHEET NOTE
Ankle DF         Ankle In         Ankle Ev         Strength LEFT RIGHT     HIP Flexors   4+ 4+ 5-   HIP Abductors   4+     HIP Ext         Hip ER         Knee EXT (quad)   Quad tone F Quad tone G, knee ext 5- 5/5   Knee Flex (HS)   4+ 5 5/5        RESTRICTIONS/PRECAUTIONS:     Exercises/Interventions:     Access Code: [email or web address]  URL: [email or web address]  Date: 08/23/2022  Prepared by: Angela Rivera    Exercises  Standing Hamstring Stretch with Step - 1 x daily - 7 x weekly - 1 sets - 3-4 reps - 30 hold  Standing Hip Flexor Stretch - 1 x daily - 7 x weekly - 10 reps - 10 hold  Gastroc Stretch on Wall - 1 x daily - 7 x weekly - 1 sets - 4 reps - 30 hold  Seated Straight Leg Raise with Support - 1 x daily - 7 x weekly - 1-3 sets - 10 reps  Seated Long Arc Quad - 1 x daily - 7 x weekly - 1-3 sets - 10 reps  Side Stepping with Resistance at Ankles - 1 x daily - 7 x weekly - 1-3 sets - 10 reps  Standing Tandem Balance with Counter Support - 1 x daily - 7 x weekly - 5 reps - 10-15 hold      Therapeutic Ex (40863) Sets/sec Reps Notes/CUES   seated hamstring stretch :25 4    Incline gastroc stretch :30 3    True stretch, ITB R 2 10    Supine KTOS    SLR 1#   QS with roll HEP     SAQ with ball squeeze 3#    LAQ       SL clams 3# HEP  + to HEP   SL abd Focus on form         Bike  8'  At end of treatment session         Manual Intervention ([phone])      Patellar mobs/tibial mobs      ITB stretch supine    Distal ITB CFM/stick                        NMR re-education (76390)   CUES NEEDED   Pt. Ed:re findings, anatomy, progosis, HEP progression, PPP strategies   Discussed benefits of exercise in treating OA.          Tandem balance R back-airex    FSU 4\" + airex to balance 1 20 Still pain with 6\"         Side stepping at wall HEP  No resistance  YTB   SUE CCTKE :5 25 B 60#   SUE abd 2 10 B 30#   SUE ext 1 10 30#   # 2 10    Ecc LP R 90# 1 20    LP B HR 1 15 Cues to keep knees straight Therapeutic Exercise and NMR EXR  [x] (89098) Provided verbal/tactile cueing for activities related to strengthening, flexibility, endurance, ROM for improvements in LE, proximal hip, and core control with self care, mobility, lifting, ambulation.  [] (56150) Provided verbal/tactile cueing for activities related to improving balance, coordination, kinesthetic sense, posture, motor skill, proprioception  to assist with LE, proximal hip, and core control in self care, mobility, lifting, ambulation and eccentric single leg control.      NMR and Therapeutic Activities:    [x] (82202 or 37037) Provided verbal/tactile cueing for activities related to improving balance, coordination, kinesthetic sense, posture, motor skill, proprioception and motor activation to allow for proper function of core, proximal hip and LE with self care and ADLs  [] (83750) Gait Re-education- Provided training and instruction to the patient for proper LE, core and proximal hip recruitment and positioning and eccentric body weight control with ambulation re-education including up and down stairs     Home Exercise Program:    [x] (31602) Reviewed/Progressed HEP activities related to strengthening, flexibility, endurance, ROM of core, proximal hip and LE for functional self-care, mobility, lifting and ambulation/stair navigation   [] (66047)Reviewed/Progressed HEP activities related to improving balance, coordination, kinesthetic sense, posture, motor skill, proprioception of core, proximal hip and LE for self care, mobility, lifting, and ambulation/stair navigation      Manual Treatments:  PROM / STM / Oscillations-Mobs:  G-I, II, III, IV (PA's, Inf., Post.)  [x] (04584) Provided manual therapy to mobilize LE, proximal hip and/or LS spine soft tissue/joints for the purpose of modulating pain, promoting relaxation,  increasing ROM, reducing/eliminating soft tissue swelling/inflammation/restriction, improving soft tissue extensibility and allowing for proper ROM for normal function with self care, mobility, lifting and ambulation. Modalities:     [] GAME READY (VASO)- for significant edema, swelling, pain control. Charges  Timed Code Treatment Minutes: 22'   Total Treatment Minutes: 40'(with bike and stretching)     Medicare total (add KX at $2000)         BWC time in/ time out:    TE time in /out    Manual time in/out    Neuro re ed time in/out    Untimed minutes        [] EVAL (LOW) 01437   [] EVAL (MOD) 34142  [] EVAL (HIGH) 88801   [] RE-EVAL     [] LJ(31227) x     [] IONTO  [x] NMR (54014) x   1  [] VASO  [] Manual (41613) x      [] Other:  [] TA x      [] Mech Traction (10000)  [] ES(attended) (94462)      [] ES (un) (36100):       GOALS:   Patient stated goal: Managing knee problems  [x] Progressing: [] Met: [] Not Met: [] Adjusted    Therapist goals for Patient:   Short Term Goals: To be achieved in: 2 weeks  1. Independent in HEP and progression per patient tolerance, in order to prevent re-injury. [] Progressing: [x] Met: [] Not Met: [] Adjusted  2. Patient will have a decrease in pain to facilitate improvement in movement, function, and ADLs as indicated by Functional Deficits. [] Progressing: [x] Met: [] Not Met: [] Adjusted    Long Term Goals: To be achieved in: 6 weeks  1. Disability index score of 79% or more per FOTO to assist with reaching prior level of function. [] Progressing: [] Met: [x] Not Met: [] Adjusted  2. Patient will demonstrate increased AROM to 120 R knee flexion to allow for proper joint functioning as indicated by patients Functional Deficits. [] Progressing: [x] Met: [] Not Met: [] Adjusted  3. Patient will demonstrate an increase in Strength to good proximal hip strength and control, within 5lb HHD in LE to allow for proper functional mobility as indicated by patients Functional Deficits. [] Progressing: [x] Met: [] Not Met: [] Adjusted  4.  Patient will return to sitting, transfers sit<>stand and walking functional activities without increased symptoms or restriction. [x] Progressing: [] Met: [] Not Met: [] Adjusted  5. Pt. To be able to climb stairs reciprocally without pain(patient specific functional goal)    [x] Progressing: [] Met: [] Not Met: [] Adjusted     Progression Towards Functional goals:  [] Patient is progressing as expected towards functional goals listed. [x] Progression is slowed due to complexities listed. [] Progression has been slowed due to co-morbidities. [] Plan just implemented, too soon to assess goals progression  [] Other:         Overall Progression Towards Functional goals/ Treatment Progress Update:  [] Patient is progressing as expected towards functional goals listed. [x] Progression is slowed due to complexities/Impairments listed. [] Progression has been slowed due to co-morbidities. [] Plan just implemented, too soon to assess goals progression <30days   [] Goals require adjustment due to lack of progress  [] Patient is not progressing as expected and requires additional follow up with physician  [] Other    Prognosis for POC: [x] Good [] Fair  [] Poor      Patient requires continued skilled intervention: [x] Yes  [] No    Treatment/Activity Tolerance:  [x] Patient able to complete treatment  [] Patient limited by fatigue  [] Patient limited by pain    [] Patient limited by other medical complications  [] Other:     ASSESSMENT: pt. Is progressing toward PT goals with ROM and strength. He is still challenged with functional activities such as walking, prolonged sitting and steps. He still requires cues for form with ex. But this is lessening. Pt requires skilled intervention to restore ROM, strength, functional endurance and balance in order to perform ADLs without significant symptoms or limitations.       Return to Play: (if applicable)   []  Stage 1: Intro to Strength   []  Stage 2: Return to Run and Strength   []  Stage 3: Return to Jump and Strength   [] Stage 4: Dynamic Strength and Agility   []  Stage 5: Sport Specific Training     []  Ready to Return to Play, Meets All Above Stages   []  Not Ready for Return to Sports   Comments:                               PLAN: See eval. Progress CKC exercises as tolerated. [x] Continue per plan of care [] Alter current plan (see comments above)  [] Plan of care initiated [] Hold pending MD visit [] Discharge      Electronically signed by:  Nisreen Lewis, PT,  MSPT, OMT-C [id number]        Note: If patient does not return for scheduled/ recommended follow up visits, this note will serve as a discharge from care along with most recent update on progress.

## 2022-09-06 ENCOUNTER — HOSPITAL ENCOUNTER (OUTPATIENT)
Dept: PHYSICAL THERAPY | Age: 62
Setting detail: THERAPIES SERIES
Discharge: HOME | End: 2022-09-06
Payer: COMMERCIAL

## 2022-09-06 PROCEDURE — 97112 NEUROMUSCULAR REEDUCATION: CPT | Performed by: PHYSICAL THERAPIST

## 2022-09-06 NOTE — FLOWSHEET NOTE
[facility],  [address]  Phone: [phone]  Fax [phone]    Physical Therapy Treatment Note/ Progress Report:           Date:  2022    Patient Name:  Fermin Wolfe    :  1960  MRN: [id number]  Restrictions/Precautions:    Medical/Treatment Diagnosis Information:  Diagnosis: M17.11 (ICD-10-CM) - Primary osteoarthritis of right knee  M22.41 (ICD-10-CM) - Chondromalacia patellae of right knee  M65.9 (ICD-10-CM) - Synovitis of right knee   Treatment diagnosis:Right knee pain M25.561                                         Insurance/Certification information:     Physician Information:   Yamil Robledo MD        Has the plan of care been signed (Y/N):        []  Yes  [x]  No     Date of Patient follow up with Physician: 22      Is this a Progress Report:     []  Yes  [x]  No        If Yes:  Date Range for reporting period:  Beginning 22  Ending 22    Progress report will be due (10 Rx or 30 days whichever is less): 07       Recertification will be due (POC Duration  / 90 days whichever is less): 6 weeks      Visit # Insurance Allowable Auth Required   In-person 10 [employer]/ 60 visits/high deductible []  Yes [x]  No    Telehealth   []  Yes []  No    Total          Functional Scale: FOTO 67/100    Date assessed:  22    FOTO 64/100       22    FOTO 67/100       22      Therapy Diagnosis/Practice Pattern:E      Number of Comorbidities:  []0     [x]1-2    []3+    Latex Allergy:  [x]NO      []YES  Preferred Language for Healthcare:   [x]English       []other:      Pain level:  2/10-more stiff than pain     SUBJECTIVE: The knee is again feeling pretty good today.   OBJECTIVE:     Observation:  Test measurements:    ROM LEFT RIGHT current 22   HIP Flex 80 80     HIP Abd         HIP Ext         HIP IR         HIP ER         Knee ext 0 0 0 0   Knee Flex 115 100 125 A/ 130 P 123 A XXX,RXX,LXX X30 ea                       Therapeutic Exercise and NMR EXR  [x] (88897) Provided verbal/tactile cueing for activities related to strengthening, flexibility, endurance, ROM for improvements in LE, proximal hip, and core control with self care, mobility, lifting, ambulation.  [] (61814) Provided verbal/tactile cueing for activities related to improving balance, coordination, kinesthetic sense, posture, motor skill, proprioception  to assist with LE, proximal hip, and core control in self care, mobility, lifting, ambulation and eccentric single leg control.      NMR and Therapeutic Activities:    [x] (48818 or 24630) Provided verbal/tactile cueing for activities related to improving balance, coordination, kinesthetic sense, posture, motor skill, proprioception and motor activation to allow for proper function of core, proximal hip and LE with self care and ADLs  [] (51476) Gait Re-education- Provided training and instruction to the patient for proper LE, core and proximal hip recruitment and positioning and eccentric body weight control with ambulation re-education including up and down stairs     Home Exercise Program:    [x] (76585) Reviewed/Progressed HEP activities related to strengthening, flexibility, endurance, ROM of core, proximal hip and LE for functional self-care, mobility, lifting and ambulation/stair navigation   [] (97317)Reviewed/Progressed HEP activities related to improving balance, coordination, kinesthetic sense, posture, motor skill, proprioception of core, proximal hip and LE for self care, mobility, lifting, and ambulation/stair navigation      Manual Treatments:  PROM / STM / Oscillations-Mobs:  G-I, II, III, IV (PA's, Inf., Post.)  [x] (37756) Provided manual therapy to mobilize LE, proximal hip and/or LS spine soft tissue/joints for the purpose of modulating pain, promoting relaxation,  increasing ROM, reducing/eliminating soft tissue swelling/inflammation/restriction, improving soft tissue extensibility and allowing for proper ROM for normal function with self care, mobility, lifting and ambulation. Modalities:     [] GAME READY (VASO)- for significant edema, swelling, pain control. Charges  Timed Code Treatment Minutes: 22'   Total Treatment Minutes: 40'(with bike and stretching)     Medicare total (add KX at $2000)         BWC time in/ time out:    TE time in /out    Manual time in/out    Neuro re ed time in/out    Untimed minutes        [] EVAL (LOW) 94616   [] EVAL (MOD) 97449  [] EVAL (HIGH) 27321   [] RE-EVAL     [] CC(93260) x     [] IONTO  [x] NMR (39928) x   1  [] VASO  [] Manual (89307) x      [] Other:  [] TA x      [] Mech Traction (66666)  [] ES(attended) (56895)      [] ES (un) (72815):       GOALS:   Patient stated goal: Managing knee problems  [x] Progressing: [] Met: [] Not Met: [] Adjusted    Therapist goals for Patient:   Short Term Goals: To be achieved in: 2 weeks  1. Independent in HEP and progression per patient tolerance, in order to prevent re-injury. [] Progressing: [x] Met: [] Not Met: [] Adjusted  2. Patient will have a decrease in pain to facilitate improvement in movement, function, and ADLs as indicated by Functional Deficits. [] Progressing: [x] Met: [] Not Met: [] Adjusted    Long Term Goals: To be achieved in: 6 weeks  1. Disability index score of 79% or more per FOTO to assist with reaching prior level of function. [] Progressing: [] Met: [x] Not Met: [] Adjusted  2. Patient will demonstrate increased AROM to 120 R knee flexion to allow for proper joint functioning as indicated by patients Functional Deficits. [] Progressing: [x] Met: [] Not Met: [] Adjusted  3. Patient will demonstrate an increase in Strength to good proximal hip strength and control, within 5lb HHD in LE to allow for proper functional mobility as indicated by patients Functional Deficits. [] Progressing: [x] Met: [] Not Met: [] Adjusted  4.  Patient will return to sitting, transfers sit<>stand and walking functional activities without increased symptoms or restriction. [x] Progressing: [] Met: [] Not Met: [] Adjusted  5. Pt. To be able to climb stairs reciprocally without pain(patient specific functional goal)    [x] Progressing: [] Met: [] Not Met: [] Adjusted     Progression Towards Functional goals:  [] Patient is progressing as expected towards functional goals listed. [x] Progression is slowed due to complexities listed. [] Progression has been slowed due to co-morbidities. [] Plan just implemented, too soon to assess goals progression  [] Other:         Overall Progression Towards Functional goals/ Treatment Progress Update:  [] Patient is progressing as expected towards functional goals listed. [x] Progression is slowed due to complexities/Impairments listed. [] Progression has been slowed due to co-morbidities. [] Plan just implemented, too soon to assess goals progression <30days   [] Goals require adjustment due to lack of progress  [] Patient is not progressing as expected and requires additional follow up with physician  [] Other    Prognosis for POC: [x] Good [] Fair  [] Poor      Patient requires continued skilled intervention: [x] Yes  [] No    Treatment/Activity Tolerance:  [x] Patient able to complete treatment  [] Patient limited by fatigue  [] Patient limited by pain    [] Patient limited by other medical complications  [] Other:     ASSESSMENT: pt. Tolerating steps up to 6\" without pain today, begin discharge planning for gym workout. Return to Play: (if applicable)   []  Stage 1: Intro to Strength   []  Stage 2: Return to Run and Strength   []  Stage 3: Return to Jump and Strength   []  Stage 4: Dynamic Strength and Agility   []  Stage 5: Sport Specific Training     []  Ready to Return to Play, Meets All Above Stages   []  Not Ready for Return to Sports   Comments:                               PLAN: See eval. Progress CKC exercises as tolerated.  DC to gym workout [place] if doing well. [x] Continue per plan of care [] Alter current plan (see comments above)  [] Plan of care initiated [] Hold pending MD visit [] Discharge      Electronically signed by:  Nu Gibson, PT,  MSPT, OMT-C [id number]        Note: If patient does not return for scheduled/ recommended follow up visits, this note will serve as a discharge from care along with most recent update on progress.

## 2022-09-09 ENCOUNTER — HOSPITAL ENCOUNTER (OUTPATIENT)
Dept: PHYSICAL THERAPY | Age: 62
Setting detail: THERAPIES SERIES
Discharge: HOME | End: 2022-09-09
Payer: COMMERCIAL

## 2022-09-09 PROCEDURE — 97112 NEUROMUSCULAR REEDUCATION: CPT | Performed by: PHYSICAL THERAPIST

## 2022-09-09 NOTE — DISCHARGE SUMMARY
[facility],  [address]  Phone: [phone]  Fax [phone]    Physical Therapy Treatment Note/ Progress Report:           Date:  2022    Patient Name:  Nitin Godinez    :  1960  MRN: [id number]  Restrictions/Precautions:    Medical/Treatment Diagnosis Information:  Diagnosis: M17.11 (ICD-10-CM) - Primary osteoarthritis of right knee  M22.41 (ICD-10-CM) - Chondromalacia patellae of right knee  M65.9 (ICD-10-CM) - Synovitis of right knee   Treatment diagnosis:Right knee pain M25.561                                         Insurance/Certification information:     Physician Information:   Gilbert Arias MD        Has the plan of care been signed (Y/N):        []  Yes  [x]  No     Date of Patient follow up with Physician: 22      Is this a Progress Report:     []  Yes  [x]  No        If Yes:  Date Range for reporting period:  Beginning 22  Ending 22    Progress report will be due (10 Rx or 30 days whichever is less):        Recertification will be due (POC Duration  / 90 days whichever is less):      Visit # Insurance Allowable Auth Required   In-person 11 BCBS/ 60 visits/high deductible []  Yes [x]  No    Telehealth   []  Yes []  No    Total          Functional Scale: FOTO 67/100    Date assessed:  22    FOTO 64/100       22    FOTO 67/100       22      Therapy Diagnosis/Practice Pattern:E      Number of Comorbidities:  []0     [x]1-2    []3+    Latex Allergy:  [x]NO      []YES  Preferred Language for Healthcare:   [x]English       []other:      Pain level:  0-1/10    SUBJECTIVE: The knee was a little stiff in the front earlier today.   OBJECTIVE:     Observation:  Test measurements:    ROM LEFT RIGHT current 22   HIP Flex 80 80     HIP Abd         HIP Ext         HIP IR         HIP ER         Knee ext 0 0 0 0   Knee Flex 115 100 125 A/ 130 P 123 A   Ankle PF         Ankle DF Ankle In         Ankle Ev         Strength LEFT RIGHT     HIP Flexors   4+ 4+ 5-   HIP Abductors   4+     HIP Ext         Hip ER         Knee EXT (quad)   Quad tone F Quad tone G, knee ext 5- 5/5   Knee Flex (HS)   4+ 5 5/5        RESTRICTIONS/PRECAUTIONS:     Exercises/Interventions:     Access Code: [email or web address]  URL: [email or web address]  Date: 08/23/2022  Prepared by: Mary Dang      Therapeutic Ex (55724) Sets/sec Reps Notes/CUES   seated hamstring stretch :25 4    Incline gastroc stretch :30 3    True stretch, ITB R 2 10    Supine KTOS    SLR 1#   QS with roll      SAQ with ball squeeze 3#    LAQ       SL clams 3#   + to HEP   SL abd Focus on form         Bike  8'  At end of treatment session         Manual Intervention ([id number] [place] [address])      Patellar mobs/tibial mobs      ITB stretch supine    Distal ITB CFM/stick                        NMR re-education (19613)   CUES NEEDED   Pt. Ed:re findings, anatomy, progosis, HEP progression, PPP strategies   Discussed benefits of exercise in treating OA. Tandem balance R back-airex    FSU 6\",4\" + airex to balance 1 10 ea Still pain with 6\"         Side stepping at wall HEP  No resistance  YTB   SUE CCTKE :3 30 B 60#   SUE abd 2 15 B 30#   SUE ext 1 15 30#   # 2 15    Ecc LP R 90# 2 10    LP R only70#    B HR            DD WS XXX,RXX,LXX,  X20 ea     DD mini squat hold :3 10    SLB R with 3 pt. kick 1 10                Therapeutic Exercise and NMR EXR  [x] (32566) Provided verbal/tactile cueing for activities related to strengthening, flexibility, endurance, ROM for improvements in LE, proximal hip, and core control with self care, mobility, lifting, ambulation.  [] (40711) Provided verbal/tactile cueing for activities related to improving balance, coordination, kinesthetic sense, posture, motor skill, proprioception  to assist with LE, proximal hip, and core control in self care, mobility, lifting, ambulation and eccentric single leg control. NMR and Therapeutic Activities:    [x] (51076 or 71649) Provided verbal/tactile cueing for activities related to improving balance, coordination, kinesthetic sense, posture, motor skill, proprioception and motor activation to allow for proper function of core, proximal hip and LE with self care and ADLs  [] (49876) Gait Re-education- Provided training and instruction to the patient for proper LE, core and proximal hip recruitment and positioning and eccentric body weight control with ambulation re-education including up and down stairs     Home Exercise Program:    [x] (92671) Reviewed/Progressed HEP activities related to strengthening, flexibility, endurance, ROM of core, proximal hip and LE for functional self-care, mobility, lifting and ambulation/stair navigation   [] (00980)Reviewed/Progressed HEP activities related to improving balance, coordination, kinesthetic sense, posture, motor skill, proprioception of core, proximal hip and LE for self care, mobility, lifting, and ambulation/stair navigation      Manual Treatments:  PROM / STM / Oscillations-Mobs:  G-I, II, III, IV (PA's, Inf., Post.)  [x] (30759) Provided manual therapy to mobilize LE, proximal hip and/or LS spine soft tissue/joints for the purpose of modulating pain, promoting relaxation,  increasing ROM, reducing/eliminating soft tissue swelling/inflammation/restriction, improving soft tissue extensibility and allowing for proper ROM for normal function with self care, mobility, lifting and ambulation. Modalities:     [] GAME READY (VASO)- for significant edema, swelling, pain control.      Charges  Timed Code Treatment Minutes: 22'   Total Treatment Minutes: 40'(with bike and stretching)     Medicare total (add KX at $2000)         BWC time in/ time out:    TE time in /out    Manual time in/out    Neuro re ed time in/out    Untimed minutes        [] EVAL (LOW) 25890   [] EVAL (MOD) 74380  [] EVAL (HIGH) 99883   [] RE-EVAL     [] CO(57490) x [] IONTO  [x] NMR (38952) x   1  [] VASO  [] Manual (63092) x      [] Other:  [] TA x      [] Mech Traction (26278)  [] ES(attended) (50730)      [] ES (un) (75127):       GOALS:   Patient stated goal: Managing knee problems  [] Progressing: [x] Met: [] Not Met: [] Adjusted    Therapist goals for Patient:   Short Term Goals: To be achieved in: 2 weeks  1. Independent in HEP and progression per patient tolerance, in order to prevent re-injury. [] Progressing: [x] Met: [] Not Met: [] Adjusted  2. Patient will have a decrease in pain to facilitate improvement in movement, function, and ADLs as indicated by Functional Deficits. [] Progressing: [x] Met: [] Not Met: [] Adjusted    Long Term Goals: To be achieved in: 6 weeks  1. Disability index score of 79% or more per FOTO to assist with reaching prior level of function. [x] Progressing: [] Met: [] Not Met: [] Adjusted  2. Patient will demonstrate increased AROM to 120 R knee flexion to allow for proper joint functioning as indicated by patients Functional Deficits. [] Progressing: [x] Met: [] Not Met: [] Adjusted  3. Patient will demonstrate an increase in Strength to good proximal hip strength and control, within 5lb HHD in LE to allow for proper functional mobility as indicated by patients Functional Deficits. [] Progressing: [x] Met: [] Not Met: [] Adjusted  4. Patient will return to sitting, transfers sit<>stand and walking functional activities without increased symptoms or restriction. [] Progressing: [x] Met: [] Not Met: [] Adjusted  5. Pt. To be able to climb stairs reciprocally without pain(patient specific functional goal)    [] Progressing: [x] Met: [] Not Met: [] Adjusted     Progression Towards Functional goals:  [x] Patient is progressing as expected towards functional goals listed. [] Progression is slowed due to complexities listed. [] Progression has been slowed due to co-morbidities.   [] Plan just implemented, too soon to assess goals progression  [] Other:         Overall Progression Towards Functional goals/ Treatment Progress Update:  [] Patient is progressing as expected towards functional goals listed. [x] Progression is slowed due to complexities/Impairments listed. [] Progression has been slowed due to co-morbidities. [] Plan just implemented, too soon to assess goals progression <30days   [] Goals require adjustment due to lack of progress  [] Patient is not progressing as expected and requires additional follow up with physician  [] Other    Prognosis for POC: [x] Good [] Fair  [] Poor      Patient requires continued skilled intervention: [x] Yes  [] No    Treatment/Activity Tolerance:  [x] Patient able to complete treatment  [] Patient limited by fatigue  [] Patient limited by pain    [] Patient limited by other medical complications  [] Other:     ASSESSMENT:pt. Has met goals of PT except for FOTO score, however has been making progress with this. He was given updated HEP and bands today and also given [employer] information for the [facility]. Return to Play: (if applicable)   []  Stage 1: Intro to Strength   []  Stage 2: Return to Run and Strength   []  Stage 3: Return to Jump and Strength   []  Stage 4: Dynamic Strength and Agility   []  Stage 5: Sport Specific Training     []  Ready to Return to Play, Meets All Above Stages   []  Not Ready for Return to Sports   Comments:                               PLAN:   [] Continue per plan of care [] Alter current plan (see comments above)  [] Plan of care initiated [] Hold pending MD visit [x] Discharge      Electronically signed by:  Mercedes Villegas, PT,  MSPT, OMT-C [id number]        Note: If patient does not return for scheduled/ recommended follow up visits, this note will serve as a discharge from care along with most recent update on progress.

## 2022-09-16 ENCOUNTER — TELEPHONE (OUTPATIENT)
Dept: ORTHOPEDIC SURGERY | Age: 62
End: 2022-09-16

## 2022-09-16 NOTE — TELEPHONE ENCOUNTER
Spoke to patient and let them know that euflexxa injections have been denied for their RIGHT knee. Spoke to patient about direct purchase options as well as option for cash based program through [address]. Asked patient to call me when they decide what they would like to do.

## 2023-03-16 ENCOUNTER — TELEPHONE (OUTPATIENT)
Dept: ORTHOPEDIC SURGERY | Age: 63
End: 2023-03-16

## 2023-03-16 NOTE — TELEPHONE ENCOUNTER
Medical Facility Question     Facility Name: [facility]  Contact Name: MEHTA  Contact Number: [phone]  Request or Information: EUFLEXXA PAID FOR AND READY TO BE DROPPED OFF. PLEASE ADVISE.

## 2023-03-16 NOTE — TELEPHONE ENCOUNTER
SPOKE TO [facility], EUFLEXXA WILL BE DELIVERED Friday 3/17/23. ONCE MEDICATION IS DELIVERED, I WILL CALL PATIENT TO SCHEDULE HIM FOR INJECTIONS.

## 2023-04-17 ENCOUNTER — OFFICE VISIT (OUTPATIENT)
Dept: ORTHOPEDIC SURGERY | Age: 63
End: 2023-04-17

## 2023-04-17 DIAGNOSIS — M17.11 PRIMARY OSTEOARTHRITIS OF RIGHT KNEE: Primary | ICD-10-CM

## 2023-04-17 DIAGNOSIS — M25.561 RIGHT KNEE PAIN, UNSPECIFIED CHRONICITY: ICD-10-CM

## 2023-04-17 DIAGNOSIS — M22.41 CHONDROMALACIA PATELLAE OF RIGHT KNEE: ICD-10-CM

## 2023-04-17 RX ORDER — HYALURONATE SODIUM 10 MG/ML
20 SYRINGE (ML) INTRAARTICULAR ONCE
Status: COMPLETED | OUTPATIENT
Start: 2023-04-17 | End: 2023-04-17

## 2023-04-17 RX ADMIN — Medication 20 MG: at 11:25

## 2023-04-17 NOTE — PROGRESS NOTES
CC:  FU Knee Osteoarthritis with Viscosupplementation      History of Present Illness:  Roverto Nguyen is a 61 y.o. male who is a very pleasant weight male who is a  for [employer] and does play recreational [employer]ce ball quite frequently and walk and per the patient is a prediabetic on metformin and is hoping to lose weight and states that he has had episodic pain to his right knee for the past several years but more of a dull and achy but states that in early July 2022 became much more problematic with increased pain and swelling and actively limping. He initially treated himself with some ice and Tylenol but was eventually evaluated in after-hours on 7/18/2020 was placed on Medrol pack which is helped 60 to 70%. He was given a wraparound brace which she was using incorrectly. It is more of a soreness and stiffness both anteriorly and medially and was most problematic with sitting for prolonged period getting up such as getting up from his desk and going up and down stairs and walking on uneven surfaces. He did have x-rays showing significant medial compartment arthropathy but has no previous history of surgery. His major goal is to continue to walk and exercise to help lose weight and control his sugars. He is being seen today for orthopedic and sports consultation with imaging. He has not been on anti-inflammatories    Tin Jenkins was initially evaluated for his right knee in the office on 7/25/2022 and was found to have fairly substantial bone-on-bone changes to the medial compartment with patellofemoral arthropathy. With conservative treatment, he has improved 75 to 80% and has no longer having the constant pain. It is more aggravating and sore but is not having substantial rest or night pain. He has improved with regard of swelling and denies locking or catching.   He does get benefit from utilizing his brace and feels more stable and feels as if he is getting stronger with physical

## 2023-04-24 ENCOUNTER — OFFICE VISIT (OUTPATIENT)
Dept: ORTHOPEDIC SURGERY | Age: 63
End: 2023-04-24
Payer: COMMERCIAL

## 2023-04-24 DIAGNOSIS — M22.41 CHONDROMALACIA PATELLAE OF RIGHT KNEE: ICD-10-CM

## 2023-04-24 DIAGNOSIS — M25.561 RIGHT KNEE PAIN, UNSPECIFIED CHRONICITY: ICD-10-CM

## 2023-04-24 DIAGNOSIS — M17.11 PRIMARY OSTEOARTHRITIS OF RIGHT KNEE: Primary | ICD-10-CM

## 2023-04-24 PROCEDURE — 99999 PR OFFICE/OUTPT VISIT,PROCEDURE ONLY: CPT | Performed by: FAMILY MEDICINE

## 2023-04-24 PROCEDURE — 20610 DRAIN/INJ JOINT/BURSA W/O US: CPT | Performed by: FAMILY MEDICINE

## 2023-04-24 RX ORDER — HYALURONATE SODIUM 10 MG/ML
20 SYRINGE (ML) INTRAARTICULAR ONCE
Status: COMPLETED | OUTPATIENT
Start: 2023-04-24 | End: 2023-04-24

## 2023-04-24 RX ADMIN — Medication 20 MG: at 11:20

## 2023-04-24 NOTE — PROGRESS NOTES
CC:  FU Knee Osteoarthritis with Viscosupplementation      History of Present Illness:  Naima Arce is a 61 y.o. male who is a very pleasant weight male who is a  for [employer] and does play recreational [employer]ce ball quite frequently and walk and per the patient is a prediabetic on metformin and is hoping to lose weight and states that he has had episodic pain to his right knee for the past several years but more of a dull and achy but states that in early July 2022 became much more problematic with increased pain and swelling and actively limping. He initially treated himself with some ice and Tylenol but was eventually evaluated in after-hours on 7/18/2020 was placed on Medrol pack which is helped 60 to 70%. He was given a wraparound brace which she was using incorrectly. It is more of a soreness and stiffness both anteriorly and medially and was most problematic with sitting for prolonged period getting up such as getting up from his desk and going up and down stairs and walking on uneven surfaces. He did have x-rays showing significant medial compartment arthropathy but has no previous history of surgery. His major goal is to continue to walk and exercise to help lose weight and control his sugars. He is being seen today for orthopedic and sports consultation with imaging. He has not been on anti-inflammatories    Jerri Whitt was initially evaluated for his right knee in the office on 7/25/2022 and was found to have fairly substantial bone-on-bone changes to the medial compartment with patellofemoral arthropathy. With conservative treatment, he has improved 75 to 80% and has no longer having the constant pain. It is more aggravating and sore but is not having substantial rest or night pain. He has improved with regard of swelling and denies locking or catching.   He does get benefit from utilizing his brace and feels more stable and feels as if he is getting stronger with physical Statement Selected

## 2023-07-31 ENCOUNTER — OFFICE VISIT (OUTPATIENT)
Dept: ORTHOPEDIC SURGERY | Age: 63
End: 2023-07-31
Payer: COMMERCIAL

## 2023-07-31 VITALS — BODY MASS INDEX: 36.37 KG/M2 | HEIGHT: 68 IN | WEIGHT: 240 LBS

## 2023-07-31 DIAGNOSIS — M17.11 PRIMARY OSTEOARTHRITIS OF RIGHT KNEE: Primary | ICD-10-CM

## 2023-07-31 DIAGNOSIS — M22.41 CHONDROMALACIA PATELLAE OF RIGHT KNEE: ICD-10-CM

## 2023-07-31 DIAGNOSIS — M25.561 RIGHT KNEE PAIN, UNSPECIFIED CHRONICITY: ICD-10-CM

## 2023-07-31 PROCEDURE — 99213 OFFICE O/P EST LOW 20 MIN: CPT | Performed by: FAMILY MEDICINE

## 2023-07-31 RX ORDER — METHYLPREDNISOLONE 4 MG/1
TABLET ORAL
Qty: 21 KIT | Refills: 0 | Status: SHIPPED | OUTPATIENT
Start: 2023-07-31

## 2023-07-31 RX ORDER — MELOXICAM 15 MG/1
15 TABLET ORAL DAILY
Qty: 30 TABLET | Refills: 3 | Status: SHIPPED | OUTPATIENT
Start: 2023-07-31

## 2023-07-31 NOTE — PROGRESS NOTES
pain.  He has improved with regard of swelling and denies locking or catching. He does get benefit from utilizing his brace and feels more stable and feels as if he is getting stronger with physical therapy. He has had 4 sessions thus far and has been good about performing his home-based exercises. No locking or high-grade night pain. He describes the current symptoms as more irritating as opposed to highly limiting. We last saw Brock Meredith in the office on 8/22/2022 for his fairly prominent bone-on-bone arthritic changes to the medial compartment of his right knee with patellofemoral arthropathy. With initial conservative treatment last year, he did have prominent 75 to 80% improvement however he is become a little bit more symptomatic recently. We had tried to get viscosupplementation approved for him last year however it was denied by his insurance and is admitting he can be better about performing his home-based exercises. Denies locking or catching. He is having variable pain range between a 2 to a 6-7 out of 10 but is primarily achy. He is hoping to put off surgery as long as possible. He is thinking about trying viscosupplementation as a direct purchase to see if it helps him as he is hoping to put off knee arthroplasty as long as he can. Denies locking catching or true instability symptoms. We last saw Shannan Cazares in the office on 4/24/2023 when we completed his initial round of viscosupplementation with Euflexxa. He presents back today overall stating he is doing much better. He is at least 80% improved but it did take some time for his viscosupplementation to kick in. For the most part it is mostly an ache at this point but has been very good about keeping up with his exercise program and states that his current symptoms are tolerable at this point.   If he is walking on uneven surfaces he will utilize his brace and has continued with his anti-inflammatory medications with meloxicam.  Denies

## 2024-06-26 ENCOUNTER — TELEPHONE (OUTPATIENT)
Dept: ORTHOPEDIC SURGERY | Age: 64
End: 2024-06-26

## 2024-06-26 DIAGNOSIS — M25.561 RIGHT KNEE PAIN, UNSPECIFIED CHRONICITY: ICD-10-CM

## 2024-06-26 DIAGNOSIS — M65.9 SYNOVITIS OF RIGHT KNEE: ICD-10-CM

## 2024-06-26 DIAGNOSIS — M17.11 PRIMARY OSTEOARTHRITIS OF RIGHT KNEE: Primary | ICD-10-CM

## 2024-06-26 DIAGNOSIS — M22.41 CHONDROMALACIA PATELLAE OF RIGHT KNEE: ICD-10-CM

## 2024-06-26 RX ORDER — MELOXICAM 15 MG/1
15 TABLET ORAL DAILY
Qty: 30 TABLET | Refills: 3 | Status: SHIPPED | OUTPATIENT
Start: 2024-06-26

## 2024-06-26 NOTE — TELEPHONE ENCOUNTER
Prescription Refill     Medication Name:  MELOXICAM 15MG   Pharmacy: [facility] [address]   Patient Contact Number:  [phone]     PATIENT CALLING REQUESTING A MEDICATION REFILL FOR THE PRESCRIPTION ABOVE     PLEASE ADVISE

## 2024-12-12 DIAGNOSIS — M22.41 CHONDROMALACIA PATELLAE OF RIGHT KNEE: ICD-10-CM

## 2024-12-12 DIAGNOSIS — M25.561 RIGHT KNEE PAIN, UNSPECIFIED CHRONICITY: ICD-10-CM

## 2024-12-12 DIAGNOSIS — M17.11 PRIMARY OSTEOARTHRITIS OF RIGHT KNEE: ICD-10-CM

## 2024-12-12 DIAGNOSIS — M65.961 SYNOVITIS OF RIGHT KNEE: ICD-10-CM

## 2024-12-12 RX ORDER — MELOXICAM 15 MG/1
15 TABLET ORAL DAILY
Qty: 30 TABLET | Refills: 3 | Status: SHIPPED | OUTPATIENT
Start: 2024-12-12

## 2025-04-25 DIAGNOSIS — M65.961 SYNOVITIS OF RIGHT KNEE: ICD-10-CM

## 2025-04-25 DIAGNOSIS — M22.41 CHONDROMALACIA PATELLAE OF RIGHT KNEE: ICD-10-CM

## 2025-04-25 DIAGNOSIS — M25.561 RIGHT KNEE PAIN, UNSPECIFIED CHRONICITY: ICD-10-CM

## 2025-04-25 DIAGNOSIS — M17.11 PRIMARY OSTEOARTHRITIS OF RIGHT KNEE: ICD-10-CM

## 2025-04-27 RX ORDER — MELOXICAM 15 MG/1
15 TABLET ORAL DAILY
Qty: 30 TABLET | Refills: 3 | Status: SHIPPED | OUTPATIENT
Start: 2025-04-27

## 2025-06-18 ENCOUNTER — OFFICE VISIT (OUTPATIENT)
Dept: ORTHOPEDIC SURGERY | Age: 65
End: 2025-06-18
Payer: MEDICARE

## 2025-06-18 DIAGNOSIS — M17.0 PRIMARY OSTEOARTHRITIS OF BOTH KNEES: Primary | ICD-10-CM

## 2025-06-18 DIAGNOSIS — M22.41 CHONDROMALACIA PATELLAE OF RIGHT KNEE: ICD-10-CM

## 2025-06-18 DIAGNOSIS — M25.561 RIGHT KNEE PAIN, UNSPECIFIED CHRONICITY: ICD-10-CM

## 2025-06-18 DIAGNOSIS — M22.42 CHONDROMALACIA PATELLAE OF LEFT KNEE: ICD-10-CM

## 2025-06-18 PROCEDURE — 1123F ACP DISCUSS/DSCN MKR DOCD: CPT | Performed by: FAMILY MEDICINE

## 2025-06-18 PROCEDURE — G8421 BMI NOT CALCULATED: HCPCS | Performed by: FAMILY MEDICINE

## 2025-06-18 PROCEDURE — 3017F COLORECTAL CA SCREEN DOC REV: CPT | Performed by: FAMILY MEDICINE

## 2025-06-18 PROCEDURE — 1036F TOBACCO NON-USER: CPT | Performed by: FAMILY MEDICINE

## 2025-06-18 PROCEDURE — G8428 CUR MEDS NOT DOCUMENT: HCPCS | Performed by: FAMILY MEDICINE

## 2025-06-18 PROCEDURE — 99214 OFFICE O/P EST MOD 30 MIN: CPT | Performed by: FAMILY MEDICINE

## 2025-06-18 PROCEDURE — 20610 DRAIN/INJ JOINT/BURSA W/O US: CPT | Performed by: FAMILY MEDICINE

## 2025-06-18 RX ORDER — BUPIVACAINE HYDROCHLORIDE 2.5 MG/ML
4 INJECTION, SOLUTION INFILTRATION; PERINEURAL ONCE
Status: COMPLETED | OUTPATIENT
Start: 2025-06-18 | End: 2025-06-18

## 2025-06-18 RX ORDER — BETAMETHASONE SODIUM PHOSPHATE AND BETAMETHASONE ACETATE 3; 3 MG/ML; MG/ML
24 INJECTION, SUSPENSION INTRA-ARTICULAR; INTRALESIONAL; INTRAMUSCULAR; SOFT TISSUE ONCE
Status: COMPLETED | OUTPATIENT
Start: 2025-06-18 | End: 2025-06-18

## 2025-06-18 RX ORDER — MELOXICAM 15 MG/1
15 TABLET ORAL DAILY
Qty: 30 TABLET | Refills: 3 | Status: SHIPPED | OUTPATIENT
Start: 2025-06-18

## 2025-06-18 RX ADMIN — BETAMETHASONE SODIUM PHOSPHATE AND BETAMETHASONE ACETATE 24 MG: 3; 3 INJECTION, SUSPENSION INTRA-ARTICULAR; INTRALESIONAL; INTRAMUSCULAR; SOFT TISSUE at 11:12

## 2025-06-18 RX ADMIN — Medication 2 ML: at 11:12

## 2025-06-18 RX ADMIN — BUPIVACAINE HYDROCHLORIDE 10 MG: 2.5 INJECTION, SOLUTION INFILTRATION; PERINEURAL at 11:13

## 2025-06-18 NOTE — PROGRESS NOTES
Chief Complaint  Knee Pain (CK JUJU KNEES )        FU progressively worsening right anterior medial knee pain with significant bone-on-bone changes medial compartment and symptomatic patellofemoral arthropathy completion of viscosupplementation 4/2023 with episodic synovitis    History of Present Illness:  Ignacio Murillo is a 65 y.o. male who is a very pleasant weight male who is a  for [employer] and does play recreational [employer] ball quite frequently and walk and per the patient is a prediabetic on metformin and is hoping to lose weight and states that he has had episodic pain to his right knee for the past several years but more of a dull and achy but states that in early July 2022 became much more problematic with increased pain and swelling and actively limping.  He initially treated himself with some ice and Tylenol but was eventually evaluated in after-hours on 7/18/2020 was placed on Medrol pack which is helped 60 to 70%.  He was given a wraparound brace which she was using incorrectly.  It is more of a soreness and stiffness both anteriorly and medially and was most problematic with sitting for prolonged period getting up such as getting up from his desk and going up and down stairs and walking on uneven surfaces.  He did have x-rays showing significant medial compartment arthropathy but has no previous history of surgery.  His major goal is to continue to walk and exercise to help lose weight and control his sugars.  He is being seen today for orthopedic and sports consultation with imaging.  He has not been on anti-inflammatories    Roshan was initially evaluated for his right knee in the office on 7/25/2022 and was found to have fairly substantial bone-on-bone changes to the medial compartment with patellofemoral arthropathy.  With conservative treatment, he has improved 75 to 80% and has no longer having the constant pain.  It is more aggravating and sore but is not having

## 2025-07-14 ENCOUNTER — OFFICE VISIT (OUTPATIENT)
Dept: ORTHOPEDIC SURGERY | Age: 65
End: 2025-07-14
Payer: MEDICARE

## 2025-07-14 DIAGNOSIS — M65.961 SYNOVITIS OF RIGHT KNEE: ICD-10-CM

## 2025-07-14 DIAGNOSIS — M22.42 CHONDROMALACIA PATELLAE OF LEFT KNEE: ICD-10-CM

## 2025-07-14 DIAGNOSIS — M17.0 PRIMARY OSTEOARTHRITIS OF BOTH KNEES: Primary | ICD-10-CM

## 2025-07-14 PROCEDURE — 1036F TOBACCO NON-USER: CPT | Performed by: FAMILY MEDICINE

## 2025-07-14 PROCEDURE — 99213 OFFICE O/P EST LOW 20 MIN: CPT | Performed by: FAMILY MEDICINE

## 2025-07-14 PROCEDURE — 20610 DRAIN/INJ JOINT/BURSA W/O US: CPT | Performed by: FAMILY MEDICINE

## 2025-07-14 PROCEDURE — 1123F ACP DISCUSS/DSCN MKR DOCD: CPT | Performed by: FAMILY MEDICINE

## 2025-07-14 PROCEDURE — G8428 CUR MEDS NOT DOCUMENT: HCPCS | Performed by: FAMILY MEDICINE

## 2025-07-14 PROCEDURE — G8421 BMI NOT CALCULATED: HCPCS | Performed by: FAMILY MEDICINE

## 2025-07-14 PROCEDURE — 3017F COLORECTAL CA SCREEN DOC REV: CPT | Performed by: FAMILY MEDICINE

## 2025-07-14 NOTE — PROGRESS NOTES
Chief Complaint  Knee Pain (FU JUJU KNEES )        FU progressively worsening right anterior medial knee pain with significant bone-on-bone changes medial compartment and symptomatic patellofemoral arthropathy completion of viscosupplementation 4/2023 with episodic synovitis    History of Present Illness:  Ignacio Murillo is a 65 y.o. male who is a very pleasant weight male who is a  for [employer] and does play recreational [employer] ball quite frequently and walk and per the patient is a prediabetic on metformin and is hoping to lose weight and states that he has had episodic pain to his right knee for the past several years but more of a dull and achy but states that in early July 2022 became much more problematic with increased pain and swelling and actively limping.  He initially treated himself with some ice and Tylenol but was eventually evaluated in after-hours on 7/18/2020 was placed on Medrol pack which is helped 60 to 70%.  He was given a wraparound brace which she was using incorrectly.  It is more of a soreness and stiffness both anteriorly and medially and was most problematic with sitting for prolonged period getting up such as getting up from his desk and going up and down stairs and walking on uneven surfaces.  He did have x-rays showing significant medial compartment arthropathy but has no previous history of surgery.  His major goal is to continue to walk and exercise to help lose weight and control his sugars.  He is being seen today for orthopedic and sports consultation with imaging.  He has not been on anti-inflammatories    Roshan was initially evaluated for his right knee in the office on 7/25/2022 and was found to have fairly substantial bone-on-bone changes to the medial compartment with patellofemoral arthropathy.  With conservative treatment, he has improved 75 to 80% and has no longer having the constant pain.  It is more aggravating and sore but is not having

## 2025-07-21 ENCOUNTER — OFFICE VISIT (OUTPATIENT)
Dept: ORTHOPEDIC SURGERY | Age: 65
End: 2025-07-21

## 2025-07-21 DIAGNOSIS — M17.0 PRIMARY OSTEOARTHRITIS OF BOTH KNEES: Primary | ICD-10-CM

## 2025-07-21 NOTE — PROGRESS NOTES
CC:  FU Knee Osteoarthritis with Viscosupplementation      FU progressively worsening right anterior medial knee pain with significant bone-on-bone changes medial compartment and symptomatic patellofemoral arthropathy completion of viscosupplementation 4/2023 with episodic synovitis    History of Present Illness:  Ignacio Murillo is a 65 y.o. male who is a very pleasant weight male who is a  for [employer] and does play recreational [employer] ball quite frequently and walk and per the patient is a prediabetic on metformin and is hoping to lose weight and states that he has had episodic pain to his right knee for the past several years but more of a dull and achy but states that in early July 2022 became much more problematic with increased pain and swelling and actively limping.  He initially treated himself with some ice and Tylenol but was eventually evaluated in after-hours on 7/18/2020 was placed on Medrol pack which is helped 60 to 70%.  He was given a wraparound brace which she was using incorrectly.  It is more of a soreness and stiffness both anteriorly and medially and was most problematic with sitting for prolonged period getting up such as getting up from his desk and going up and down stairs and walking on uneven surfaces.  He did have x-rays showing significant medial compartment arthropathy but has no previous history of surgery.  His major goal is to continue to walk and exercise to help lose weight and control his sugars.  He is being seen today for orthopedic and sports consultation with imaging.  He has not been on anti-inflammatories    Roshan was initially evaluated for his right knee in the office on 7/25/2022 and was found to have fairly substantial bone-on-bone changes to the medial compartment with patellofemoral arthropathy.  With conservative treatment, he has improved 75 to 80% and has no longer having the constant pain.  It is more aggravating and sore but is not having

## 2025-07-28 ENCOUNTER — OFFICE VISIT (OUTPATIENT)
Dept: ORTHOPEDIC SURGERY | Age: 65
End: 2025-07-28

## 2025-07-28 VITALS — WEIGHT: 240 LBS | HEIGHT: 68 IN | BODY MASS INDEX: 36.37 KG/M2

## 2025-07-28 DIAGNOSIS — M22.42 CHONDROMALACIA PATELLAE OF LEFT KNEE: ICD-10-CM

## 2025-07-28 DIAGNOSIS — G89.29 BILATERAL CHRONIC KNEE PAIN: ICD-10-CM

## 2025-07-28 DIAGNOSIS — M25.562 BILATERAL CHRONIC KNEE PAIN: ICD-10-CM

## 2025-07-28 DIAGNOSIS — M25.561 BILATERAL CHRONIC KNEE PAIN: ICD-10-CM

## 2025-07-28 DIAGNOSIS — M17.0 PRIMARY OSTEOARTHRITIS OF BOTH KNEES: Primary | ICD-10-CM

## 2025-07-28 RX ORDER — METHYLPREDNISOLONE 4 MG/1
TABLET ORAL
Qty: 21 KIT | Refills: 0 | Status: SHIPPED | OUTPATIENT
Start: 2025-07-28

## 2025-07-28 NOTE — PROGRESS NOTES
CC:  FU Knee Osteoarthritis with Viscosupplementation      FU progressively worsening right anterior medial knee pain with significant bone-on-bone changes medial compartment and symptomatic patellofemoral arthropathy completion of viscosupplementation 4/2023 with episodic synovitis    History of Present Illness:  Ignacio Murillo is a 65 y.o. male who is a very pleasant weight male who is a  for [email or web address] and does play recreational [employer] quite frequently and walk and per the patient is a prediabetic on metformin and is hoping to lose weight and states that he has had episodic pain to his right knee for the past several years but more of a dull and achy but states that in early July 2022 became much more problematic with increased pain and swelling and actively limping.  He initially treated himself with some ice and Tylenol but was eventually evaluated in after-hours on 7/18/2020 was placed on Medrol pack which is helped 60 to 70%.  He was given a wraparound brace which she was using incorrectly.  It is more of a soreness and stiffness both anteriorly and medially and was most problematic with sitting for prolonged period getting up such as getting up from his desk and going up and down stairs and walking on uneven surfaces.  He did have x-rays showing significant medial compartment arthropathy but has no previous history of surgery.  His major goal is to continue to walk and exercise to help lose weight and control his sugars.  He is being seen today for orthopedic and sports consultation with imaging.  He has not been on anti-inflammatories    Roshan was initially evaluated for his right knee in the office on 7/25/2022 and was found to have fairly substantial bone-on-bone changes to the medial compartment with patellofemoral arthropathy.  With conservative treatment, he has improved 75 to 80% and has no longer having the constant pain.  It is more aggravating and sore but is not having

## 2025-08-25 ENCOUNTER — OFFICE VISIT (OUTPATIENT)
Dept: ORTHOPEDIC SURGERY | Age: 65
End: 2025-08-25

## 2025-08-25 DIAGNOSIS — M22.42 CHONDROMALACIA PATELLAE OF LEFT KNEE: ICD-10-CM

## 2025-08-25 DIAGNOSIS — M17.0 PRIMARY OSTEOARTHRITIS OF BOTH KNEES: Primary | ICD-10-CM

## 2025-08-25 DIAGNOSIS — G89.29 BILATERAL CHRONIC KNEE PAIN: ICD-10-CM

## 2025-08-25 DIAGNOSIS — M25.561 BILATERAL CHRONIC KNEE PAIN: ICD-10-CM

## 2025-08-25 DIAGNOSIS — M25.562 BILATERAL CHRONIC KNEE PAIN: ICD-10-CM

## 2025-08-25 RX ORDER — BUPIVACAINE HYDROCHLORIDE 2.5 MG/ML
4 INJECTION, SOLUTION INFILTRATION; PERINEURAL ONCE
Status: COMPLETED | OUTPATIENT
Start: 2025-08-25 | End: 2025-08-25

## 2025-08-25 RX ORDER — BETAMETHASONE SODIUM PHOSPHATE AND BETAMETHASONE ACETATE 3; 3 MG/ML; MG/ML
24 INJECTION, SUSPENSION INTRA-ARTICULAR; INTRALESIONAL; INTRAMUSCULAR; SOFT TISSUE ONCE
Status: COMPLETED | OUTPATIENT
Start: 2025-08-25 | End: 2025-08-25

## 2025-08-25 RX ADMIN — Medication 2 ML: at 09:47

## 2025-08-25 RX ADMIN — BETAMETHASONE SODIUM PHOSPHATE AND BETAMETHASONE ACETATE 24 MG: 3; 3 INJECTION, SUSPENSION INTRA-ARTICULAR; INTRALESIONAL; INTRAMUSCULAR; SOFT TISSUE at 09:46

## 2025-08-25 RX ADMIN — BUPIVACAINE HYDROCHLORIDE 10 MG: 2.5 INJECTION, SOLUTION INFILTRATION; PERINEURAL at 09:47
